# Patient Record
Sex: FEMALE | Race: WHITE | Employment: OTHER | URBAN - METROPOLITAN AREA
[De-identification: names, ages, dates, MRNs, and addresses within clinical notes are randomized per-mention and may not be internally consistent; named-entity substitution may affect disease eponyms.]

---

## 2017-04-12 ENCOUNTER — OFFICE VISIT (OUTPATIENT)
Dept: INTERNAL MEDICINE | Facility: CLINIC | Age: 72
End: 2017-04-12
Payer: COMMERCIAL

## 2017-04-12 ENCOUNTER — TELEPHONE (OUTPATIENT)
Dept: NURSING | Facility: CLINIC | Age: 72
End: 2017-04-12

## 2017-04-12 VITALS
HEIGHT: 64 IN | OXYGEN SATURATION: 98 % | BODY MASS INDEX: 26.77 KG/M2 | HEART RATE: 64 BPM | SYSTOLIC BLOOD PRESSURE: 102 MMHG | WEIGHT: 156.8 LBS | TEMPERATURE: 98 F | DIASTOLIC BLOOD PRESSURE: 60 MMHG

## 2017-04-12 DIAGNOSIS — H61.23 BILATERAL IMPACTED CERUMEN: Primary | ICD-10-CM

## 2017-04-12 PROCEDURE — 99213 OFFICE O/P EST LOW 20 MIN: CPT | Performed by: PHYSICIAN ASSISTANT

## 2017-04-12 NOTE — TELEPHONE ENCOUNTER
"Call Type: Triage Call    Presenting Problem: Ana has  \"right ear that plugged.\"   [facility]  Triage/Ear:  symptoms/disposition is to be seen within 72 hours and  Ana agreed.  Triage Note:  Guideline Title: Ear: Symptoms  Recommended Disposition: Provide Home/Self Care  Original Inclination: Wanted to speak with a nurse  Override Disposition:  Intended Action: Call PCP/HCP  Physician Contacted: No  Plugged or hollow feeling in ear(s) accompanied by a dry non-productive cough OR  recent air travel or underwater diving ?  YES  Laceration/abrasion of ear ? NO  Pain when moving outer ear ? NO  Bloody ear drainage ? NO  Any ear drainage in immunocompromised person ? NO  Severe pain (sharp, stabbing, throbbing or excruciating aching) unresponsive to 24  hours of home care ? NO  Constant or intermittent dull earache, throbbing in ear(s) or feeling of fullness;  may interfere with sleep or ability to carry out normal activities ? NO  Temperature of 101.5 F (38.6 C) or greater that has not responded to 24 hours of  home care measures ? NO  Blunt ear trauma resulting in a small mass/knot (hematoma) of the ear lobe ? NO  Ear drainage that is thick and yellow (looks like pus not earwax) ? NO  Recent trauma AND blood or clear fluid draining from ear(s) OR new deafness or  marked decrease in hearing ? NO  Pain, swelling, tenderness or redness over bone behind ear ? NO  Severe, persistent tinnitus (ringing in ears) lasting more than 7 days AND  depression ? NO  Severe, persistent tinnitus AND not previously evaluated ? NO  Sudden complete or partial hearing loss (three days or less) not associated with  any other signs or symptoms ? NO  Muffled or decreased hearing and suspected ear wax buildup AND unresolved with  home care ? NO  Object present in ear for more than 6 hours ? NO  Swelling, tenderness, OR redness of visible portion of outer ear ? NO  Pain in ear followed by a \"pop\" with new or sudden onset of change in " hearing,  ringing in ear, ear drainage, OR continued pain AND not resolved within 12 hours  ? NO  Known or suspected ear foreign body AND new or worsening hearing loss or other  symptom ? NO  Other head injury is of most concern ? NO  Any temperature elevation in an immunocompromised individual OR frail elderly with  signs of dehydration ? NO  Physician Instructions:  Care Advice: Call provider if symptoms worsen or new symptoms develop.  Do not use eardrops unless directed by a healthcare provider, especially if  having ear drainage.  CAUTIONS  SYMPTOM / CONDITION MANAGEMENT  Consider nonprescription decongestant (Sudafed, Drixoral) for relief of  symptoms after checking with a provider, especially if there is a history  of hypertension, hyperthyroidism, heart disease, diabetes, glaucoma,  urinary retention caused by prostatic hypertrophy.  When Traveling By Air: - During airplane descent, swallow, yawn,  autoinflate by breathing in, or hold nose gently and blow out while keeping  mouth closed. - Chewing gum or sucking on candy will stimulate swallowing.  - Do not sleep during the descent. - People with an upper respiratory  infection may consider taking an nonprescription decongestant nasal spray  or oral decongestant an hour before takeoff and landing, if not  contraindicated by an existing diagnosis. - Consider use of nonprescription  filtered earplugs (e.g. Ear Planes) which slowly equalize ear pressure  during takeoff and landing.

## 2017-04-12 NOTE — MR AVS SNAPSHOT
"              After Visit Summary   4/12/2017    Ana Leyva    MRN: [id number]           Patient Information     Date Of Birth          1945        Visit Information        Provider Department      4/12/2017 3:00 PM Hemalatha Lomas PA-C [facility]        Today's Diagnoses     Bilateral impacted cerumen    -  1       Follow-ups after your visit        Who to contact     If you have questions or need follow up information about today's clinic visit or your schedule please contact [facility] directly at [phone].  Normal or non-critical lab and imaging results will be communicated to you by MyChart, letter or phone within 4 business days after the clinic has received the results. If you do not hear from us within 7 days, please contact the clinic through [employer] or phone. If you have a critical or abnormal lab result, we will notify you by phone as soon as possible.  Submit refill requests through [employer] or call your pharmacy and they will forward the refill request to us. Please allow 3 business days for your refill to be completed.          Additional Information About Your Visit        MyChart Information     [employer] gives you secure access to your electronic health record. If you see a primary care provider, you can also send messages to your care team and make appointments. If you have questions, please call your primary care clinic.  If you do not have a primary care provider, please call [phone] and they will assist you.        Care EveryWhere ID     This is your Care EveryWhere ID. This could be used by other organizations to access your [place] medical records  [id number]        Your Vitals Were     Pulse Temperature Height Pulse Oximetry BMI (Body Mass Index)       64 98  F (36.7  C) 5' 3.5\" (1.613 m) 98% 27.34 kg/m2        Blood Pressure from Last 3 Encounters:   04/12/17 102/60   11/14/16 118/73   10/13/16 118/68    Weight " from Last 3 Encounters:   04/12/17 156 lb 12.8 oz (71.1 kg)   11/14/16 169 lb 6.4 oz (76.8 kg)   10/13/16 172 lb (78 kg)              Today, you had the following     No orders found for display       Primary Care Provider Office Phone # Fax #    Marlen Hua [phone]       [facility] [address] [facility] [address]  [facility] [address]        Thank you!     Thank you for choosing [facility]  for your care. Our goal is always to provide you with excellent care. Hearing back from our patients is one way we can continue to improve our services. Please take a few minutes to complete the written survey that you may receive in the mail after your visit with us. Thank you!             Your Updated Medication List - Protect others around you: Learn how to safely use, store and throw away your medicines at www.disposemymeds.org.          This list is accurate as of: 4/12/17  4:01 PM.  Always use your most recent med list.                   Brand Name Dispense Instructions for use    Biotin 5000 MCG Caps      Take 1 capsule by mouth daily       CALCIUM-MAGNESIUM-ZINC PO      Take 1 tablet by mouth daily       cholecalciferol 1000 UNIT tablet    vitamin D    100 tablet    Take 1 tablet by mouth daily.       DAILY MULTIVITAMIN PO      Take  by mouth. Mature 50+       Flaxseed Oil 1200 MG Caps      Take 1 capsule by mouth daily       GLUCOSAMINE-CHONDROIT-MSM-C-MN PO      1500 - 800 - 750mg       levothyroxine 88 MCG tablet    SYNTHROID/LEVOTHROID    90 tablet    Take 1 tablet (88 mcg) by mouth daily       OMEGA-3 FISH OIL PO      Take 2 g by mouth daily       OMEGA-3-6-9 PO      Take 67 mg by mouth.

## 2017-04-12 NOTE — NURSING NOTE
"Chief Complaint   Patient presents with     Tinnitus     R ear       Initial /60  Pulse 64  Temp 98  F (36.7  C)  Ht 5' 3.5\" (1.613 m)  Wt 156 lb 12.8 oz (71.1 kg)  SpO2 98%  BMI 27.34 kg/m2 Estimated body mass index is 27.34 kg/(m^2) as calculated from the following:    Height as of this encounter: 5' 3.5\" (1.613 m).    Weight as of this encounter: 156 lb 12.8 oz (71.1 kg).  Medication Reconciliation: complete   Jen Singh MA   "

## 2017-04-12 NOTE — PROGRESS NOTES
"  SUBJECTIVE:                                                    Ana Leyva is a 71 year old female who presents to clinic today for the following health issues:      Concern - tinnitus     Onset: 5 days     Description:   Ringing in R ear, feels pressure/plugged in the head     Intensity: mild    Progression of Symptoms:  worsening    Accompanying Signs & Symptoms:  none     Previous history of similar problem:   Yes,  Years ago    Precipitating factors:   Worsened by: none    Alleviating factors:  Improved by: none       Therapies Tried and outcome:warm water, no relief    Hx of allergies, just got back from [place]- drove-     -------------------------------------    Problem list and histories reviewed & adjusted, as indicated.  Additional history: as documented    Labs reviewed in EPIC    Reviewed and updated as needed this visit by clinical staff  Allergies       Reviewed and updated as needed this visit by Provider         ROS:  Constitutional, HEENT, cardiovascular, pulmonary, gi and gu systems are negative, except as otherwise noted.    OBJECTIVE:                                                    /60  Pulse 64  Temp 98  F (36.7  C)  Ht 5' 3.5\" (1.613 m)  Wt 156 lb 12.8 oz (71.1 kg)  SpO2 98%  BMI 27.34 kg/m2  Body mass index is 27.34 kg/(m^2).  GENERAL: healthy, alert and no distress  HENT: normal cephalic/atraumatic, both ears: occluded with wax and oropharynx clear  RESP: lungs clear to auscultation - no rales, rhonchi or wheezes  CV: regular rates and rhythm and normal S1 S2, no S3 or S4    Diagnostic Test Results:  none      ASSESSMENT/PLAN:                                                            1. Bilateral impacted cerumen  Ear lavage with good results         F.u prn with pcp    Hemalatha Lomas PA-C  [facility]  "

## 2017-05-19 ENCOUNTER — OFFICE VISIT (OUTPATIENT)
Dept: FAMILY MEDICINE | Facility: CLINIC | Age: 72
End: 2017-05-19
Payer: COMMERCIAL

## 2017-05-19 VITALS
HEART RATE: 64 BPM | WEIGHT: 156 LBS | DIASTOLIC BLOOD PRESSURE: 63 MMHG | BODY MASS INDEX: 26.63 KG/M2 | SYSTOLIC BLOOD PRESSURE: 108 MMHG | HEIGHT: 64 IN | OXYGEN SATURATION: 98 % | TEMPERATURE: 97.7 F

## 2017-05-19 DIAGNOSIS — M85.80 OSTEOPENIA: Chronic | ICD-10-CM

## 2017-05-19 DIAGNOSIS — R91.8 PULMONARY NODULES: Primary | Chronic | ICD-10-CM

## 2017-05-19 DIAGNOSIS — E03.9 ACQUIRED HYPOTHYROIDISM: Chronic | ICD-10-CM

## 2017-05-19 DIAGNOSIS — G56.01 CARPAL TUNNEL SYNDROME OF RIGHT WRIST: ICD-10-CM

## 2017-05-19 DIAGNOSIS — E61.1 IRON DEFICIENCY: ICD-10-CM

## 2017-05-19 PROCEDURE — 99213 OFFICE O/P EST LOW 20 MIN: CPT | Performed by: INTERNAL MEDICINE

## 2017-05-19 NOTE — PATIENT INSTRUCTIONS
Schedule future lung CT scan - Please call to schedule your radiology study at [facility]: [phone]  Colonoscopy, bone density and mammogram this winter after your physical  See me in November  Keep up the great work!

## 2017-05-19 NOTE — NURSING NOTE
"Chief Complaint   Patient presents with     Follow Up For     PT and recheck Thyroid.       Initial /63 (BP Location: Left arm, Cuff Size: Adult Regular)  Pulse 64  Temp 97.7  F (36.5  C) (Tympanic)  Ht 5' 3.5\" (1.613 m)  Wt 156 lb (70.8 kg)  SpO2 98%  Breastfeeding? No  BMI 27.2 kg/m2 Estimated body mass index is 27.2 kg/(m^2) as calculated from the following:    Height as of this encounter: 5' 3.5\" (1.613 m).    Weight as of this encounter: 156 lb (70.8 kg).  Medication Reconciliation: complete     Hemalatha Manning MA      "

## 2017-05-19 NOTE — PROGRESS NOTES
"  SUBJECTIVE:                                                    Ana Leyva is a 71 year old female who presents to clinic today for the following health issues:    Ana had a nice winter in [place]. Did have to unfortunately make a trek back d/t death of her sister.    Patient reports of back issues associated with long car rides and traveling. Reports alleviation with physical activity. She did the physical therapy I had ordered at her physical last fall and says that helped a lot. She has also been working on slow weight loss with decreased portions and more activity and has been quite successful. Is on Weight Watchers.    S/P rotator cuff surgery of right arm and complains of sharp pain intermittently upon ROM now of left shoulder - she will try to journal patterns. Not anxious for another surgery. Is mostly with reaching forward. Still has good strength.     Aware she is due for colonoscopy and will be sure to complete this [place] and [place]EXA at that time too.    Patient mentions of carpal tunnel and asks about treatment options, but does not prefer surgery. We discussed bracing at [facility] and she liked that option.    Taking Mg+ for leg cramps and asks if there is anything she could do to help this. Currently taking iron sometimes; denies constipation.      ROS:  Detailed as above     This document serves as a record of the services and decisions personally performed and made by Marlen Hua DO. It was created on his/her behalf by Lluvia Amado, a trained medical scribe. The creation of this document is based the provider's statements to the medical scribe.  Scribe Lluvia Amado 4:57 PM, May 19, 2017    OBJECTIVE:                                                    /63 (BP Location: Left arm, Cuff Size: Adult Regular)  Pulse 64  Temp 97.7  F (36.5  C) (Tympanic)  Ht 5' 3.5\" (1.613 m)  Wt 156 lb (70.8 kg)  SpO2 98%  Breastfeeding? No  BMI 27.2 kg/m2  Body mass index " is 27.2 kg/(m^2).  GENERAL: healthy, alert and no distress, robust for age  SKIN: scar tissue of R palmar surface of wrist with some tightness of the soft tissue  MSK: c/o some mild discomfort of left shoulder when reaching forward but is able to do so without difficulty  PSYCH: mentation appears normal, affect normal/bright     ASSESSMENT/PLAN:                                                      1. Pulmonary nodule - incidental finding on right   Due for f/t CT  - CT Chest w Contrast; Future    2. Carpal tunnel syndrome of right wrist  Try bracing at noc    3. Acquired hypothyroidism  On stable dose of levothyroxine for years  Will check lab with physical this fall  TSH   Date Value Ref Range Status   07/12/2016 0.84 0.40 - 4.00 mU/L Final       4. Iron deficiency  Does donate blood; will be regular with iron    5. Osteopenia  Due for DEXA this fall          Patient Instructions   Schedule future lung CT scan - [facility]: [phone]  Colonoscopy, bone density and mammogram this winter after your physical  See me in November  Keep up the great work!        The information in this document, created by the medical scribe for me, accurately reflects the services I personally performed and the decisions made by me. I have reviewed and approved this document for accuracy prior to leaving the patient care area.  Marlen Hua DO  4:57 PM, 05/19/17    Marlen Hua DO  [facility]

## 2017-05-19 NOTE — MR AVS SNAPSHOT
After Visit Summary   5/19/2017    Ana Leyva    MRN: [id number]           Patient Information     Date Of Birth          1945        Visit Information        Provider Department      5/19/2017 4:30 PM Marlen Hua, DO [facility]        Today's Diagnoses     Pulmonary nodule - incidental finding on right     -  1      Care Instructions    Schedule future lung CT scan - Please [facility]: [phone]  Colonoscopy, bone density and mammogram this winter after your physical  See me in November  Keep up the great work!          Follow-ups after your visit        Future tests that were ordered for you today     Open Future Orders        Priority Expected Expires Ordered    CT Chest w Contrast Routine  5/19/2018 5/19/2017            Who to contact     If you have questions or need follow up information about today's clinic visit or your schedule please contact [facility] directly at [phone].  Normal or non-critical lab and imaging results will be communicated to you by [employer], letter or phone within 4 business days after the clinic has received the results. If you do not hear from us within 7 days, please contact the clinic through [employer] or phone. If you have a critical or abnormal lab result, we will notify you by phone as soon as possible.  Submit refill requests through [employer] or call your pharmacy and they will forward the refill request to us. Please allow 3 business days for your refill to be completed.          Additional Information About Your Visit        [employer] Information     [employer] gives you secure access to your electronic health record. If you see a primary care provider, you can also send messages to your care team and make appointments. If you have questions, please call your primary care clinic.  If you do not have a primary care provider, please call [phone] and they will assist  "you.        Care EveryWhere ID     This is your Care EveryWhere ID. This could be used by other organizations to access your [place] medical records  [id number]        Your Vitals Were     Pulse Temperature Height Pulse Oximetry Breastfeeding? BMI (Body Mass Index)    64 97.7  F (36.5  C) (Tympanic) 5' 3.5\" (1.613 m) 98% No 27.2 kg/m2       Blood Pressure from Last 3 Encounters:   05/19/17 108/63   04/12/17 102/60   11/14/16 118/73    Weight from Last 3 Encounters:   05/19/17 156 lb (70.8 kg)   04/12/17 156 lb 12.8 oz (71.1 kg)   11/14/16 169 lb 6.4 oz (76.8 kg)               Primary Care Provider Office Phone # Fax #    Marlen Hua [phone]       [facility] [address]  [facility] [address]        Thank you!     Thank you for choosing [facility]  for your care. Our goal is always to provide you with excellent care. Hearing back from our patients is one way we can continue to improve our services. Please take a few minutes to complete the written survey that you may receive in the mail after your visit with us. Thank you!             Your Updated Medication List - Protect others around you: Learn how to safely use, store and throw away your medicines at www.disposemymeds.org.          This list is accurate as of: 5/19/17  5:08 PM.  Always use your most recent med list.                   Brand Name Dispense Instructions for use    Biotin 5000 MCG Caps      Take 1 capsule by mouth daily       CALCIUM-MAGNESIUM-ZINC PO      Take 1 tablet by mouth daily       cholecalciferol 1000 UNIT tablet    vitamin D    100 tablet    Take 1 tablet by mouth daily.       DAILY MULTIVITAMIN PO      Take  by mouth. Mature 50+       Flaxseed Oil 1200 MG Caps      Take 1 capsule by mouth daily       GLUCOSAMINE-CHONDROIT-MSM-C-MN PO      1500 - 800 - 750mg       levothyroxine 88 MCG tablet    SYNTHROID/LEVOTHROID    90 tablet    Take 1 tablet (88 mcg) by mouth daily       " OMEGA-3 FISH OIL PO      Take 2 g by mouth daily       OMEGA-3-6-9 PO      Take 67 mg by mouth.

## 2017-05-21 PROBLEM — G56.01 CARPAL TUNNEL SYNDROME OF RIGHT WRIST: Status: ACTIVE | Noted: 2017-05-21

## 2017-05-21 PROBLEM — Z52.008 BLOOD DONOR: Status: ACTIVE | Noted: 2017-05-21

## 2017-05-26 ENCOUNTER — HOSPITAL ENCOUNTER (OUTPATIENT)
Dept: CT IMAGING | Facility: CLINIC | Age: 72
Discharge: HOME | End: 2017-05-26
Attending: INTERNAL MEDICINE | Admitting: INTERNAL MEDICINE
Payer: MEDICARE

## 2017-05-26 DIAGNOSIS — R91.8 PULMONARY NODULES: Chronic | ICD-10-CM

## 2017-05-26 PROCEDURE — 71260 CT THORAX DX C+: CPT

## 2017-05-26 PROCEDURE — 25000125 ZZHC RX 250: Performed by: INTERNAL MEDICINE

## 2017-05-26 PROCEDURE — 25000128 H RX IP 250 OP 636: Performed by: INTERNAL MEDICINE

## 2017-05-26 RX ORDER — IOPAMIDOL 755 MG/ML
75 INJECTION, SOLUTION INTRAVASCULAR ONCE
Status: COMPLETED | OUTPATIENT
Start: 2017-05-26 | End: 2017-05-26

## 2017-05-26 RX ADMIN — IOPAMIDOL 75 ML: 755 INJECTION, SOLUTION INTRAVENOUS at 10:36

## 2017-05-26 RX ADMIN — SODIUM CHLORIDE 70 ML: 9 INJECTION, SOLUTION INTRAVENOUS at 10:36

## 2017-08-28 ENCOUNTER — OFFICE VISIT (OUTPATIENT)
Dept: FAMILY MEDICINE | Facility: CLINIC | Age: 72
End: 2017-08-28
Payer: COMMERCIAL

## 2017-08-28 VITALS
OXYGEN SATURATION: 98 % | TEMPERATURE: 97.8 F | SYSTOLIC BLOOD PRESSURE: 115 MMHG | HEART RATE: 69 BPM | DIASTOLIC BLOOD PRESSURE: 68 MMHG | BODY MASS INDEX: 26.91 KG/M2 | WEIGHT: 157.6 LBS | HEIGHT: 64 IN

## 2017-08-28 DIAGNOSIS — R25.2 CRAMP OF LIMB: ICD-10-CM

## 2017-08-28 DIAGNOSIS — R14.1 FLATULENCE, ERUCTATION AND GAS PAIN: ICD-10-CM

## 2017-08-28 DIAGNOSIS — R14.3 FLATULENCE, ERUCTATION AND GAS PAIN: ICD-10-CM

## 2017-08-28 DIAGNOSIS — Z12.11 SPECIAL SCREENING FOR MALIGNANT NEOPLASMS, COLON: ICD-10-CM

## 2017-08-28 DIAGNOSIS — R14.2 FLATULENCE, ERUCTATION AND GAS PAIN: ICD-10-CM
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
## 2017-08-28 PROCEDURE — 99213 OFFICE O/P EST LOW 20 MIN: CPT | Performed by: INTERNAL MEDICINE

## 2017-08-28 RX ORDER — LEVOTHYROXINE SODIUM 88 UG/1
88 TABLET ORAL DAILY
Qty: 90 TABLET | Refills: 3 | Status: CANCELLED | OUTPATIENT
Start: 2017-08-28

## 2017-08-28 NOTE — NURSING NOTE
"Chief Complaint   Patient presents with     Hand Injury     Left little finger       Initial /68 (BP Location: Right arm, Patient Position: Chair, Cuff Size: Adult Regular)  Pulse 69  Temp 97.8  F (36.6  C) (Oral)  Ht 5' 3.5\" (1.613 m)  Wt 157 lb 9.6 oz (71.5 kg)  SpO2 98%  BMI 27.48 kg/m2 Estimated body mass index is 27.48 kg/(m^2) as calculated from the following:    Height as of this encounter: 5' 3.5\" (1.613 m).    Weight as of this encounter: 157 lb 9.6 oz (71.5 kg).  Medication Reconciliation: complete   Jacquie Nguyen MA  "

## 2017-08-28 NOTE — PATIENT INSTRUCTIONS
Augmentin to cover infection in your finger  You should get a call to schedule colonoscopy  Consider adding in Miralax to regulate stools better  Stretches and/or tonic water for leg cramps  See me in November

## 2017-08-28 NOTE — PROGRESS NOTES
"  SUBJECTIVE:   Ana Leyva is a 71 year old female who presents to clinic today for the following health issues:    Kirstin is here for acute concern today  Left little finger injury 8- with nail - accidental scrape at her home. Not thought to be a very dirty nail. Cleaned it well but still really red.   She is concerned b/c is going out of the country soon.  Up to date on tetanus.  No fever or joint pain.    On another note, has c/o leg cramps at night and taking magnesium and potassium; every other night only.    Is constipated too. Due soon for colonoscopy. No warning signs.    Problem list and histories reviewed & adjusted, as indicated.    ROS:  Detailed as above     OBJECTIVE:     /68 (BP Location: Right arm, Patient Position: Chair, Cuff Size: Adult Regular)  Pulse 69  Temp 97.8  F (36.6  C) (Oral)  Ht 5' 3.5\" (1.613 m)  Wt 157 lb 9.6 oz (71.5 kg)  SpO2 98%  BMI 27.48 kg/m2  Body mass index is 27.48 kg/(m^2).  Alert, pleasant, healthy appearing, NAD  Left 5th finger medial aspect intermediate phalynx with cut and surrounding erythema and swelling    ASSESSMENT/PLAN:       1. Injury by nail, initial encounter  Cover with augmentin as is red and swollen and is going to be [place] the country soon without access to care  Up to date on tetanus from 11 months ago  - amoxicillin-clavulanate (AUGMENTIN) 875-125 MG per tablet; Take 1 tablet by mouth 2 times daily  Dispense: 20 tablet; Refill: 0    2. Flatulence, eructation and gas pain  Trial of Miralax to help with her constipation  Due soon for TSH but has been on stable dose of Levothyroxine for quite awhile now    3. Special screening for malignant neoplasms, colon  Last colonoscopy was Dec 2007  - GASTROENTEROLOGY ADULT REF PROCEDURE ONLY    4. Cramp of limb  Trial of stretching and tonic water      Patient Instructions   Augmentin to cover infection in your finger  You should get a call to schedule colonoscopy  Consider adding in " Miralax to regulate stools better  Stretches and/or tonic water for leg cramps  See me in November      Marlen Hua,   [facility]

## 2017-08-28 NOTE — MR AVS SNAPSHOT
After Visit Summary   8/28/2017    Ana Leyva    MRN: [id number]           Patient Information     Date Of Birth          1945        Visit Information        Provider Department      8/28/2017 12:00 PM Marlen Hua,  [facility]        Today's Diagnoses     Injury by nail, initial encounter    -  1    Flatulence, eructation and gas pain        Special screening for malignant neoplasms, colon        Cramp of limb          Care Instructions    Augmentin to cover infection in your finger  You should get a call to schedule colonoscopy  Consider adding in Miralax to regulate stools better  Stretches and/or tonic water for leg cramps  See me in November          Follow-ups after your visit        Additional Services     GASTROENTEROLOGY ADULT REF PROCEDURE ONLY       Last Lab Result: Creatinine (mg/dL)       Date                     Value                 07/12/2016               0.82             ----------  Body mass index is 27.48 kg/(m^2).     Needed:  No  Language:  English    Patient will be contacted to schedule procedure.     Please be aware that coverage of these services is subject to the terms and limitations of your health insurance plan.  Call member services at your health plan with any benefit or coverage questions.  Any procedures must be performed at a [place] facility OR coordinated by your clinic's referral office.    Please bring the following with you to your appointment:    (1) Any X-Rays, CTs or MRIs which have been performed.  Contact the facility where they were done to arrange for  prior to your scheduled appointment.    (2) List of current medications   (3) This referral request   (4) Any documents/labs given to you for this referral                  Your next 10 appointments already scheduled     Nov 17, 2017  9:30 AM CST   PHYSICAL with Marlen Hua DO   [facility] ([facility])    [address]  "[facility] [id number] [phone]              Who to contact     If you have questions or need follow up information about today's clinic visit or your schedule please contact [facility] directly at [phone].  Normal or non-critical lab and imaging results will be communicated to you by MyChart, letter or phone within 4 business days after the clinic has received the results. If you do not hear from us within 7 days, please contact the clinic through MyChart or phone. If you have a critical or abnormal lab result, we will notify you by phone as soon as possible.  Submit refill requests through [employer] or call your pharmacy and they will forward the refill request to us. Please allow 3 business days for your refill to be completed.          Additional Information About Your Visit        [employer] Information     [employer] lets you send messages to your doctor, view your test results, renew your prescriptions, schedule appointments and more. To sign up, go to [email or web address] . Click on \"Log in\" on the left side of the screen, which will take you to the Welcome page. Then click on \"Sign up Now\" on the right side of the page.     You will be asked to enter the access code listed below, as well as some personal information. Please follow the directions to create your username and password.     Your access code is: [email or web address]  Expires: 2017 12:08 PM     Your access code will  in 90 days. If you need help or a new code, please call your [facility] or [phone].        Care EveryWhere ID     This is your Care EveryWhere ID. This could be used by other organizations to access your [place] medical records  [id number]        Your Vitals Were     Pulse Temperature Height Pulse Oximetry BMI (Body Mass Index)       69 97.8  F (36.6  C) (Oral) 5' 3.5\" (1.613 m) 98% 27.48 kg/m2        Blood Pressure from Last 3 Encounters:   17 115/68   17 108/63   17 102/60    " Weight from Last 3 Encounters:   08/28/17 157 lb 9.6 oz (71.5 kg)   05/19/17 156 lb (70.8 kg)   04/12/17 156 lb 12.8 oz (71.1 kg)              We Performed the Following     GASTROENTEROLOGY ADULT REF PROCEDURE ONLY     MYCHART ACCESS CODE - Add PCP and Click on cosign on right          Today's Medication Changes          These changes are accurate as of: 8/28/17 12:47 PM.  If you have any questions, ask your nurse or doctor.               Start taking these medicines.        Dose/Directions    amoxicillin-clavulanate 875-125 MG per tablet   Commonly known as:  AUGMENTIN   Used for:  Injury by nail, initial encounter   Started by:  Marlen Hua DO        Dose:  1 tablet   Take 1 tablet by mouth 2 times daily   Quantity:  20 tablet   Refills:  0         Stop taking these medicines if you haven't already. Please contact your care team if you have questions.     OMEGA-3-6-9 PO   Stopped by:  Marlen Hua DO                Where to get your medicines      These medications were sent to [facility], [place] - [address], [address]  [address], [facility] [address]     Phone:  [phone]     amoxicillin-clavulanate 875-125 MG per tablet                Primary Care Provider Office Phone # Fax #    Marlen Hua [phone] [address]  [facility] [address]        Equal Access to Services     [facility] AH: Hadii aad ku hadasho Soomaali, waaxda luqadaha, qaybta kaalmada adeegyada, juan manuel ospina . So [facility] [phone].    ATENCIÓN: Si habla español, tiene a garsia disposición servicios gratuitos de asistencia lingüística. Víctor al [phone].    We comply with applicable federal civil rights laws and [place] laws. We do not discriminate on the basis of race, color, national origin, age, disability sex, sexual orientation or gender identity.            Thank you!     Thank you for choosing [facility]  [place]  for your care. Our goal is always to provide you with excellent care. Hearing back from our patients is one way we can continue to improve our services. Please take a few minutes to complete the written survey that you may receive in the mail after your visit with us. Thank you!             Your Updated Medication List - Protect others around you: Learn how to safely use, store and throw away your medicines at www.disposemymeds.org.          This list is accurate as of: 8/28/17 12:47 PM.  Always use your most recent med list.                   Brand Name Dispense Instructions for use Diagnosis    amoxicillin-clavulanate 875-125 MG per tablet    AUGMENTIN    20 tablet    Take 1 tablet by mouth 2 times daily    Injury by nail, initial encounter       Biotin 5000 MCG Caps      Take 1 capsule by mouth daily        CALCIUM-MAGNESIUM-ZINC PO      Take 1 tablet by mouth daily        cholecalciferol 1000 UNIT tablet    vitamin D    100 tablet    Take 1 tablet by mouth daily.    Routine general medical examination at a health care facility       DAILY MULTIVITAMIN PO      Take  by mouth. Mature 50+        Flaxseed Oil 1200 MG Caps      Take 1 capsule by mouth daily        GLUCOSAMINE-CHONDROIT-MSM-C-MN PO      1500 - 800 - 750mg        levothyroxine 88 MCG tablet    SYNTHROID/LEVOTHROID    90 tablet    Take 1 tablet (88 mcg) by mouth daily    Acquired hypothyroidism       OMEGA-3 FISH OIL PO      Take 2 g by mouth daily

## 2017-09-06 ENCOUNTER — TELEPHONE (OUTPATIENT)
Dept: FAMILY MEDICINE | Facility: CLINIC | Age: 72
End: 2017-09-06

## 2017-09-06 NOTE — TELEPHONE ENCOUNTER
Reason for Call:   prescription    Detailed comments: Pt is calling regarding amoxicillin-clavulanate (AUGMENTIN) 875-125 MG per tablet  States that she has broken out in a rash after starting to take Rx   States it is all over her body     Phone Number Patient can be reached at: Home number on file [phone] (home)    Best Time: anytime    Can we leave a detailed message on this number? YES    Call taken on 9/6/2017 at 11:44 AM by Susana Gottlieb

## 2017-09-06 NOTE — TELEPHONE ENCOUNTER
Pt has been taking amoxicillin since OV 8/28/17.  Has 3 tabs left but d/c yesterday due to possible allergic reaction    Pt reports starting yesterday, 8 days into RX: little red dots like measles all over body, Torso, legs, arms and rash more concentrated on back.  Rash is warm to touch, and itchy but not painful. (4 inch x4 inch concentrated area)  Yesterday felt slightly short of breath, and headache and d/c medication.  Has been using benadryl and Cortizone since for sx.  Today: Denies sob and headache, however rash and itching still present at same level as yesterday.     Triage advised:   Continue benadryl and cortizone cream for sx.  If any SOB or worsening rash from this point, or if symptoms not improving by tomorrow, needs to seek medical care in [place].  Pt agreed with this plan.  Pt will not be back until 9/19/17.    Added Amox to allergy list.    If any further advice ok for triage to leave detailed message .    [phone]

## 2017-09-07 NOTE — TELEPHONE ENCOUNTER
PT called back - rash did worsen, still no sob, did see provider in [place] and following provider recs.  Advised pt to call to clinic for OV with sajan (amria g) if still having sx near end of trip) and to follow recommendations to be seen in [place] if sx worsening sooner.  Layla Johnson RN

## 2017-09-22 ENCOUNTER — OFFICE VISIT (OUTPATIENT)
Dept: FAMILY MEDICINE | Facility: CLINIC | Age: 72
End: 2017-09-22
Payer: COMMERCIAL

## 2017-09-22 VITALS
HEART RATE: 71 BPM | TEMPERATURE: 97.7 F | OXYGEN SATURATION: 96 % | WEIGHT: 163 LBS | DIASTOLIC BLOOD PRESSURE: 73 MMHG | SYSTOLIC BLOOD PRESSURE: 127 MMHG | BODY MASS INDEX: 32 KG/M2 | HEIGHT: 60 IN

## 2017-09-22 DIAGNOSIS — T50.905A DRUG REACTION, INITIAL ENCOUNTER: Primary | ICD-10-CM

## 2017-09-22 PROCEDURE — 99213 OFFICE O/P EST LOW 20 MIN: CPT | Performed by: INTERNAL MEDICINE

## 2017-09-22 RX ORDER — BETAMETHASONE VALERATE 1.2 MG/G
CREAM TOPICAL 2 TIMES DAILY
COMMUNITY
End: 2017-11-17

## 2017-09-22 RX ORDER — FEXOFENADINE HCL 180 MG/1
180 TABLET ORAL DAILY
COMMUNITY
End: 2018-09-04

## 2017-09-22 RX ORDER — FEXOFENADINE HCL AND PSEUDOEPHEDRINE HCI 60; 120 MG/1; MG/1
1 TABLET, EXTENDED RELEASE ORAL
COMMUNITY
End: 2017-09-22

## 2017-09-22 RX ORDER — FEXOFENADINE HCL AND PSEUDOEPHEDRINE HCL 180; 240 MG/1; MG/1
1 TABLET, EXTENDED RELEASE ORAL DAILY
COMMUNITY
End: 2017-09-22

## 2017-09-22 NOTE — PROGRESS NOTES
"[facility]  CLINIC PROGRESS NOTE    Subjective:  Rash   Ana Leyva was started on amoxicillin in August 28 and stopped this antibiotic on Sept 5 after developing diffuse rash all over her body.   She went to a hospital and treated with fexofenadine and betamethasone cream.  She took prescription for 5 tablets and noticed that the rash was clearing and so she stopped all medications.   She returned to [place] from [facility] 5 days ago.  She noticed that a new rash occurred two days ago on her chest.     Past medical history, medications, allergies, social history, family history reviewed and updated in [facility] as of 9/22/2017 .    ROS  CONSTITUTIONAL: no fatigue, no unexpected change in weight  SKIN: rash as above  EYES: no acute vision problems or changes  ENT: no ear problems, no mouth problems, no throat problems  RESP: no significant cough, no shortness of breath  CV: no chest pain, no palpitations, no new or worsening peripheral edema  GI: no nausea, no vomiting, no constipation, no diarrhea  : no frequency, no dysuria, no hematuria  MS: no claudication, no myalgias, no joint aches  PSYCHIATRIC: no changes in mood or affect      Objective:  Vitals  /73  Pulse 71  Temp 97.7  F (36.5  C) (Oral)  Ht 5' 0.35\" (1.533 m)  Wt 163 lb (73.9 kg)  SpO2 96%  Breastfeeding? No  BMI 31.47 kg/m2  GEN: Alert Oriented x3 NAD  HEENT: Atraumatic, normocephalic, neck supple  CV: RRR no murmurs or rubs  PULM: CTA no wheezes or crackles  ABD: Soft, nontender nondistended, no hepatosplenomegally  SKIN: Diffuse erythematous macular rash on chest  EXT: 2+ dorsal pedis pulses, no edema bilateral lower extremities  NEURO: Gait and station deferred, No focal neurologic deficits  PSYCH: Mood good, affect mood congruent    No results found for this or any previous visit (from the past 24 hour(s)).    Assessment/Plan:  Patient Instructions   (T88.7XXA) Drug reaction, initial encounter  (primary encounter " diagnosis)  Comment: Rash could be lingering from previous Augmentin, also consider monitoring any fragrances that you are using and we will continue with antihistamine (allegra) and topical betamethasone cream twice per day.  If over the weekend you are experiencing shortness of breath or wheezing go directly to the emergency department.   Plan:        Follow up in emergency department if not improving    Disclaimer: This note consists of symbols derived from keyboarding, dictation and/or voice recognition software. As a result, there may be errors in the script that have gone undetected. Please consider this when interpreting information found in this chart.    Jay Benavides MD  [phone]

## 2017-09-22 NOTE — NURSING NOTE
"Chief Complaint   Patient presents with     Rash     possible allergic reaction to antibiotic       Initial /73  Pulse 71  Temp 97.7  F (36.5  C) (Oral)  Ht 5' 0.35\" (1.533 m)  Wt 163 lb (73.9 kg)  SpO2 96%  Breastfeeding? No  BMI 31.47 kg/m2 Estimated body mass index is 31.47 kg/(m^2) as calculated from the following:    Height as of this encounter: 5' 0.35\" (1.533 m).    Weight as of this encounter: 163 lb (73.9 kg).  Medication Reconciliation: complete   [place]- CMA      "

## 2017-09-22 NOTE — PROGRESS NOTES
"HPI    SUBJECTIVE:   Ana Leyva is a 72 year old female who presents to clinic today for the following health issues:    ***      Duration: ***    Description (location/character/radiation): ***    Intensity:  {mild,moderate,severe:354600}    Accompanying signs and symptoms: ***    History (similar episodes/previous evaluation): {.:729159::\"None\"}    Precipitating or alleviating factors: {.:779159::\"None\"}    Therapies tried and outcome: {.:240240::\"None\"}         {additional problems for provider to add:888621}    Problem list and histories reviewed & adjusted, as indicated.  Additional history: {NONE - AS DOCUMENTED:570367::\"as documented\"}    {HIST REVIEW/ LINKS 2:017784}    Reviewed and updated as needed this visit by clinical staff     Reviewed and updated as needed this visit by Provider         {PROVIDER CHARTING PREFERENCE:923297}      ROS      Physical Exam      "

## 2017-09-22 NOTE — MR AVS SNAPSHOT
After Visit Summary   9/22/2017    Ana Leyva    MRN: [id number]           Patient Information     Date Of Birth          1945        Visit Information        Provider Department      9/22/2017 3:00 PM Jay Benavides MD [facility]        Today's Diagnoses     Drug reaction, initial encounter    -  1      Care Instructions    (T88.7XXA) Drug reaction, initial encounter  (primary encounter diagnosis)  Comment: Rash could be lingering from previous Augmentin, also consider monitoring any fragrances that you are using and we will continue with antihistamine (allegra) and topical betamethasone cream twice per day.  If over the weekend you are experiencing shortness of breath or wheezing go directly to the emergency department.   Plan:              Follow-ups after your visit        Your next 10 appointments already scheduled     Nov 17, 2017  9:30 AM CST   PHYSICAL with Marlen Hua DO   [facility]    [address] [facility] [address] [phone]              Who to contact     If you have questions or need follow up information about today's clinic visit or your schedule please contact [facility] directly at [phone].  Normal or non-critical lab and imaging results will be communicated to you by MyChart, letter or phone within 4 business days after the clinic has received the results. If you do not hear from us within 7 days, please contact the clinic through MyChart or phone. If you have a critical or abnormal lab result, we will notify you by phone as soon as possible.  Submit refill requests through [employer] or call your pharmacy and they will forward the refill request to us. Please allow 3 business days for your refill to be completed.          Additional Information About Your Visit        [employer] Information     [employer] lets you send messages to your doctor, view your test results, renew your  "prescriptions, schedule appointments and more. To sign up, go to [email or web address] . Click on \"Log in\" on the left side of the screen, which will take you to the Welcome page. Then click on \"Sign up Now\" on the right side of the page.     You will be asked to enter the access code listed below, as well as some personal information. Please follow the directions to create your username and password.     Your access code is: [email or web address]  Expires: 2017 12:08 PM     Your access code will  in 90 days. If you need help or a new code, please call your [place] clinic or [phone].        Care EveryWhere ID     This is your Care EveryWhere ID. This could be used by other organizations to access your [place] medical records  [id number]        Your Vitals Were     Pulse Temperature Height Pulse Oximetry Breastfeeding? BMI (Body Mass Index)    71 97.7  F (36.5  C) (Oral) 5' 0.35\" (1.533 m) 96% No 31.47 kg/m2       Blood Pressure from Last 3 Encounters:   17 127/73   17 115/68   17 108/63    Weight from Last 3 Encounters:   17 163 lb (73.9 kg)   17 157 lb 9.6 oz (71.5 kg)   17 156 lb (70.8 kg)              Today, you had the following     No orders found for display       Primary Care Provider Office Phone # Fax #    Marlen Karley Hua [phone] [address] [facility] [address]  [place] [address]        Equal Access to Services     LARA MEYER : Hadii matt Weir, stephen hampton, juan manuel moreland. So [facility] [phone].    ATENCIÓN: Si habla español, tiene a garsia disposición servicios gratuitos de asistencia lingüística. Llame al [phone].    We comply with applicable federal civil rights laws and [place] laws. We do not discriminate on the basis of race, color, national origin, age, disability sex, sexual orientation or gender identity.            Thank you!     Thank you for choosing " [facility]  for your care. Our goal is always to provide you with excellent care. Hearing back from our patients is one way we can continue to improve our services. Please take a few minutes to complete the written survey that you may receive in the mail after your visit with us. Thank you!             Your Updated Medication List - Protect others around you: Learn how to safely use, store and throw away your medicines at www.disposemymeds.org.          This list is accurate as of: 9/22/17  3:32 PM.  Always use your most recent med list.                   Brand Name Dispense Instructions for use Diagnosis    ALLEGRA ALLERGY 180 MG tablet   Generic drug:  fexofenadine      Take 180 mg by mouth daily Taking 120 mg AM and 180 mg PM        betamethasone valerate 0.1 % cream    VALISONE     Apply topically 2 times daily        Biotin 5000 MCG Caps      Take 1 capsule by mouth daily        CALCIUM-MAGNESIUM-ZINC PO      Take 1 tablet by mouth daily        cholecalciferol 1000 UNIT tablet    vitamin D    100 tablet    Take 1 tablet by mouth daily.    Routine general medical examination at a health care facility       DAILY MULTIVITAMIN PO      Take  by mouth. Mature 50+        Flaxseed Oil 1200 MG Caps      Take 1 capsule by mouth daily        GLUCOSAMINE-CHONDROIT-MSM-C-MN PO      1500 - 800 - 750mg        levothyroxine 88 MCG tablet    SYNTHROID/LEVOTHROID    90 tablet    Take 1 tablet (88 mcg) by mouth daily    Acquired hypothyroidism       OMEGA-3 FISH OIL PO      Take 2 g by mouth daily

## 2017-09-22 NOTE — PATIENT INSTRUCTIONS
(T88.7XXA) Drug reaction, initial encounter  (primary encounter diagnosis)  Comment: Rash could be lingering from previous Augmentin, also consider monitoring any fragrances that you are using and we will continue with antihistamine (allegra) and topical betamethasone cream twice per day.  If over the weekend you are experiencing shortness of breath or wheezing go directly to the emergency department.   Plan:

## 2017-11-16 NOTE — PATIENT INSTRUCTIONS
Lab and flu shot today  Future bone density with mammogram and then colonoscopy as well as dermatology before [place]  If your back pain gets worse, may need some physical therapy or medical care in [place]  Otherwise, follow up annually or as needed    Preventive Health Recommendations  Female Ages 65 +    Yearly exam:     See your health care provider every year in order to  o Review health changes.   o Discuss preventive care.    o Review your medicines if your doctor has prescribed any.      You no longer need a yearly Pap test unless you've had an abnormal Pap test in the past 10 years. If you have vaginal symptoms, such as bleeding or discharge, be sure to talk with your provider about a Pap test.      Every 1 to 2 years, have a mammogram.  If you are over 69, talk with your health care provider about whether or not you want to continue having screening mammograms.      Every 10 years, have a colonoscopy. Or, have a yearly FIT test (stool test). These exams will check for colon cancer.       Have a cholesterol test every 5 years, or more often if your doctor advises it.       Have a diabetes test (fasting glucose) every three years. If you are at risk for diabetes, you should have this test more often.       At age 65, have a bone density scan (DEXA) to check for osteoporosis (brittle bone disease).    Shots:    Get a flu shot each year.    Get a tetanus shot every 10 years.    Talk to your doctor about your pneumonia vaccines. There are now two you should receive - Pneumovax (PPSV 23) and Prevnar (PCV 13).    Talk to your doctor about the shingles vaccine.    Talk to your doctor about the hepatitis B vaccine.    Nutrition:     Eat at least 5 servings of fruits and vegetables each day.      Eat whole-grain bread, whole-wheat pasta and brown rice instead of white grains and rice.      Talk to your provider about Calcium and Vitamin D.     Lifestyle    Exercise at least 150 minutes a week (30 minutes a day, 5 days a  week). This will help you control your weight and prevent disease.      Limit alcohol to one drink per day.      No smoking.       Wear sunscreen to prevent skin cancer.       See your dentist twice a year for an exam and cleaning.      See your eye doctor every 1 to 2 years to screen for conditions such as glaucoma, macular degeneration and cataracts.

## 2017-11-16 NOTE — PROGRESS NOTES
SUBJECTIVE:   Ana Leyva is a 72 year old female who presents for Preventive Visit.  Are you in the first 12 months of your Medicare Part B coverage?  No    Healthy Habits:    Do you get at least three servings of calcium containing foods daily (dairy, green leafy vegetables, etc.)? yes    Amount of exercise or daily activities, outside of work: 5 day(s) per week    Problems taking medications regularly No    Medication side effects: No    Have you had an eye exam in the past two years? Yes     Do you see a dentist twice per year? Yes     Do you have sleep apnea, excessive snoring or daytime drowsiness?no    COGNITIVE SCREEN  1) Repeat 3 items (Banana, Sunrise, Chair)    2) Clock draw: NORMAL  3) 3 item recall: Recalls 3 objects  Results: 3 items recalled: COGNITIVE IMPAIRMENT LESS LIKELY    Mini-CogTM Copyright S Virgil. Licensed by the author for use in [place] bitmovin; reprinted with permission (camacho@.[facility]). All rights reserved.        Since I last saw her she had an allergic reaction to Augmentin  Went to hospital in [place] - given a salve and anti-histamine - she has pictures with classic appearing drug rash across body and it eventually resolved  Nail puncture wound was ok (reason Augmentin was Rx)  Back flaring up some but she isn't that bothered by it - going to [place] Dec 12th for the winter  Once only had a mild radiculopathy and then stopped the specific exercises that seemed to cause the radiculopathy  Movement in AM helps and is active during the day; if she sits still too long it worsens the back pain  No weakness, numbness or b/b changes - we reviewed signs to watch for  Weight Watchers has been very successful for her  Very healthy weight now and maintaining nearly a year  Mammo and colonoscopy scheduled and willing to schedule DEXA [place]  Mild fullness in throat recently for a few days which resolved spontaneously - she wonders if was a mild  "virus      Allergies        Social History   Substance Use Topics     Smoking status: Never Smoker     Smokeless tobacco: Never Used      Comment: tried in college     Alcohol use 0.0 oz/week     0 Standard drinks or equivalent per week      Comment: occas wine - very rare       The patient does not drink >3 drinks per day nor >7 drinks per week.     Today's PHQ-2 Score:   PHQ-2 ( 1999 Pfizer) 11/17/2017 8/28/2017   Q1: Little interest or pleasure in doing things 0 0   Q2: Feeling down, depressed or hopeless 0 0   PHQ-2 Score 0 0         Do you feel safe in your environment - Yes    Do you have a Health Care Directive?: Yes: Advance Directive has been received and scanned.    Current providers sharing in care for this patient include: Patient Care Team:  Marlen Hua DO as PCP - General (Internal Medicine)      Hearing impairment: No    Ability to successfully perform activities of daily living: Yes, no assistance needed     Fall risk:  Fallen 2 or more times in the past year?: No  Any fall with injury in the past year?: No      Home safety:  none identified      The following health maintenance items are reviewed in Epic and correct as of today:  Health Maintenance   Topic Date Due     TSH W/ FREE T4 REFLEX Q1 YEAR  07/12/2017     INFLUENZA VACCINE (SYSTEM ASSIGNED)  09/01/2017     FALL RISK ASSESSMENT  11/14/2017     DEXA Q3 YR  11/26/2017     COLONOSCOPY Q10 YR  12/06/2017     MAMMO SCREEN Q2 YR (SYSTEM ASSIGNED)  11/30/2018     ADVANCE DIRECTIVE PLANNING Q5 YRS  11/12/2021     LIPID MONITORING Q5 YEARS  11/30/2021     TETANUS Q10 YR  09/07/2026     PNEUMOCOCCAL  Completed     HEPATITIS C SCREENING  Completed       ROS:  Comprehensive ROS negative unless as stated above in HPI.      OBJECTIVE:   /71 (BP Location: Right arm, Patient Position: Chair, Cuff Size: Adult Regular)  Pulse 65  Temp 97.5  F (36.4  C) (Tympanic)  Ht 5' 3.5\" (1.613 m)  Wt 158 lb (71.7 kg)  SpO2 100%  BMI 27.55 kg/m2 " "Estimated body mass index is 27.55 kg/(m^2) as calculated from the following:    Height as of this encounter: 5' 3.5\" (1.613 m).    Weight as of this encounter: 158 lb (71.7 kg).  EXAM:   GENERAL APPEARANCE: healthy, alert and no distress, robust lady  EYES: Eyes grossly normal to inspection, PERRL and conjunctivae and sclerae normal  HENT: ear canals and TM's normal, nose and mouth without ulcers or lesions, oropharynx clear and oral mucous membranes moist  NECK: no adenopathy, no asymmetry, masses, or scars and thyroid normal to palpation  RESP: lungs clear to auscultation - no rales, rhonchi or wheezes  BREAST: normal without masses, tenderness or nipple discharge and no palpable axillary masses or adenopathy  CV: regular rate and rhythm, normal S1 S2, no S3 or S4, no murmur, click or rub, no peripheral edema   ABDOMEN: soft, nontender, no hepatosplenomegaly, no masses and bowel sounds normal  MS: no musculoskeletal defects are noted and gait is age appropriate without ataxia  NEURO: Normal strength and tone, mentation intact and speech normal  PSYCH: mentation appears normal and affect normal/bright    ASSESSMENT / PLAN:   1. Encounter for preventative adult health care exam with abnormal findings  Doing excellently  Finished the f/u CT on incidental pulmonary nodule  Will see her dermatologist before heading [place] as well as finish up preventative health care  - CBC with platelets  - Comprehensive metabolic panel    2. Acquired hypothyroidism  S/p remote EPPS for goiter  - TSH WITH FREE T4 REFLEX  - levothyroxine (SYNTHROID/LEVOTHROID) 88 MCG tablet; Take 1 tablet (88 mcg) by mouth daily  Dispense: 90 tablet; Refill: 3  - Lipid panel reflex to direct LDL Fasting    3. Osteopenia, unspecified location  Due for DEXA    4. Asymptomatic postmenopausal status  - DEXA HIP/PELVIS/SPINE - Future; Future    5. Need for prophylactic vaccination and inoculation against influenza  Administered today      End of Life " "Planning:  Patient currently has an advanced directive: Yes.  Practitioner is supportive of decision.    COUNSELING:  Reviewed preventive health counseling, as reflected in patient instructions       Regular exercise       Healthy diet/nutrition  Estimated body mass index is 27.55 kg/(m^2) as calculated from the following:    Height as of this encounter: 5' 3.5\" (1.613 m).    Weight as of this encounter: 158 lb (71.7 kg).   reports that she has never smoked. She has never used smokeless tobacco.        Appropriate preventive services were discussed with this patient, including applicable screening as appropriate for cardiovascular disease, diabetes, osteopenia/osteoporosis, and glaucoma.  As appropriate for age/gender, discussed screening for colorectal cancer, prostate cancer, breast cancer, and cervical cancer. Checklist reviewing preventive services available has been given to the patient.    Reviewed patients plan of care and provided an AVS. The Intermediate Care Plan ( asthma action plan, low back pain action plan, and migraine action plan) for Ana meets the Care Plan requirement. This Care Plan has been established and reviewed with the Patient.    Patient Instructions   Lab and flu shot today  Future bone density with mammogram and then colonoscopy as well as dermatology before [place]  If your back pain gets worse, may need some physical therapy or medical care in [place]  Otherwise, follow up annually or as needed    Marlen Hua, DO  [facility]  "

## 2017-11-17 ENCOUNTER — OFFICE VISIT (OUTPATIENT)
Dept: FAMILY MEDICINE | Facility: CLINIC | Age: 72
End: 2017-11-17
Payer: COMMERCIAL

## 2017-11-17 VITALS
HEART RATE: 65 BPM | WEIGHT: 158 LBS | OXYGEN SATURATION: 100 % | DIASTOLIC BLOOD PRESSURE: 71 MMHG | BODY MASS INDEX: 26.98 KG/M2 | HEIGHT: 64 IN | SYSTOLIC BLOOD PRESSURE: 130 MMHG | TEMPERATURE: 97.5 F

## 2017-11-17 DIAGNOSIS — Z00.01 ENCOUNTER FOR PREVENTATIVE ADULT HEALTH CARE EXAM WITH ABNORMAL FINDINGS: Primary | ICD-10-CM

## 2017-11-17 DIAGNOSIS — E03.9 ACQUIRED HYPOTHYROIDISM: Chronic | ICD-10-CM

## 2017-11-17 DIAGNOSIS — Z78.0 ASYMPTOMATIC POSTMENOPAUSAL STATUS: ICD-10-CM

## 2017-11-17 DIAGNOSIS — M85.80 OSTEOPENIA, UNSPECIFIED LOCATION: Chronic | ICD-10-CM

## 2017-11-17 DIAGNOSIS — Z23 NEED FOR PROPHYLACTIC VACCINATION AND INOCULATION AGAINST INFLUENZA: ICD-10-CM

## 2017-11-17 LAB
ERYTHROCYTE [DISTWIDTH] IN BLOOD BY AUTOMATED COUNT: 14.9 % (ref 10–15)
HCT VFR BLD AUTO: 36.2 % (ref 35–47)
HGB BLD-MCNC: 11.5 G/DL (ref 11.7–15.7)
MCH RBC QN AUTO: 26.3 PG (ref 26.5–33)
MCHC RBC AUTO-ENTMCNC: 31.8 G/DL (ref 31.5–36.5)
MCV RBC AUTO: 83 FL (ref 78–100)
PLATELET # BLD AUTO: 291 10E9/L (ref 150–450)
RBC # BLD AUTO: 4.37 10E12/L (ref 3.8–5.2)
WBC # BLD AUTO: 5.3 10E9/L (ref 4–11)

## 2017-11-17 PROCEDURE — G0008 ADMIN INFLUENZA VIRUS VAC: HCPCS | Performed by: INTERNAL MEDICINE

## 2017-11-17 PROCEDURE — 82306 VITAMIN D 25 HYDROXY: CPT | Performed by: INTERNAL MEDICINE

## 2017-11-17 PROCEDURE — 99397 PER PM REEVAL EST PAT 65+ YR: CPT | Mod: 25 | Performed by: INTERNAL MEDICINE

## 2017-11-17 PROCEDURE — 84443 ASSAY THYROID STIM HORMONE: CPT | Performed by: INTERNAL MEDICINE

## 2017-11-17 PROCEDURE — 36415 COLL VENOUS BLD VENIPUNCTURE: CPT | Performed by: INTERNAL MEDICINE

## 2017-11-17 PROCEDURE — 85027 COMPLETE CBC AUTOMATED: CPT | Performed by: INTERNAL MEDICINE

## 2017-11-17 PROCEDURE — 80061 LIPID PANEL: CPT | Performed by: INTERNAL MEDICINE

## 2017-11-17 PROCEDURE — 80053 COMPREHEN METABOLIC PANEL: CPT | Performed by: INTERNAL MEDICINE

## 2017-11-17 PROCEDURE — 90662 IIV NO PRSV INCREASED AG IM: CPT | Performed by: INTERNAL MEDICINE

## 2017-11-17 RX ORDER — FOLIC ACID 0.8 MG
1 TABLET ORAL DAILY
Qty: 30 CAPSULE | COMMUNITY
Start: 2017-11-17

## 2017-11-17 RX ORDER — LEVOTHYROXINE SODIUM 88 UG/1
88 TABLET ORAL DAILY
Qty: 90 TABLET | Refills: 3 | Status: SHIPPED | OUTPATIENT
Start: 2017-11-17 | End: 2018-11-19

## 2017-11-17 NOTE — MR AVS SNAPSHOT
After Visit Summary   11/17/2017    Ana Leyva    MRN: [id number]           Patient Information     Date Of Birth          1945        Visit Information        Provider Department      11/17/2017 9:30 AM Marlen Hua,  [facility]        Today's Diagnoses     Encounter for preventative adult health care exam with abnormal findings    -  1    Acquired hypothyroidism        Osteopenia, unspecified location        Asymptomatic postmenopausal status        Need for prophylactic vaccination and inoculation against influenza          Care Instructions    Lab and flu shot today  Future bone density with mammogram and then colonoscopy as well as dermatology before [place]  If your back pain gets worse, may need some physical therapy or medical care in [place]  Otherwise, follow up annually or as needed    Preventive Health Recommendations  Female Ages 65 +    Yearly exam:     See your health care provider every year in order to  o Review health changes.   o Discuss preventive care.    o Review your medicines if your doctor has prescribed any.      You no longer need a yearly Pap test unless you've had an abnormal Pap test in the past 10 years. If you have vaginal symptoms, such as bleeding or discharge, be sure to talk with your provider about a Pap test.      Every 1 to 2 years, have a mammogram.  If you are over 69, talk with your health care provider about whether or not you want to continue having screening mammograms.      Every 10 years, have a colonoscopy. Or, have a yearly FIT test (stool test). These exams will check for colon cancer.       Have a cholesterol test every 5 years, or more often if your doctor advises it.       Have a diabetes test (fasting glucose) every three years. If you are at risk for diabetes, you should have this test more often.       At age 65, have a bone density scan (DEXA) to check for osteoporosis (brittle bone disease).    Shots:    Get a flu shot each  year.    Get a tetanus shot every 10 years.    Talk to your doctor about your pneumonia vaccines. There are now two you should receive - Pneumovax (PPSV 23) and Prevnar (PCV 13).    Talk to your doctor about the shingles vaccine.    Talk to your doctor about the hepatitis B vaccine.    Nutrition:     Eat at least 5 servings of fruits and vegetables each day.      Eat whole-grain bread, whole-wheat pasta and brown rice instead of white grains and rice.      Talk to your provider about Calcium and Vitamin D.     Lifestyle    Exercise at least 150 minutes a week (30 minutes a day, 5 days a week). This will help you control your weight and prevent disease.      Limit alcohol to one drink per day.      No smoking.       Wear sunscreen to prevent skin cancer.       See your dentist twice a year for an exam and cleaning.      See your eye doctor every 1 to 2 years to screen for conditions such as glaucoma, macular degeneration and cataracts.          Follow-ups after your visit        Your next 10 appointments already scheduled     Nov 27, 2017   Procedure with Donell Nelson MD   [facility] ([facility])    [facility] [address]   [phone]           [facility] is located at [address] [place] [address]            Dec 04, 2017  1:00 PM CST   MA SCREENING DIGITAL BILATERAL with [id number]   [facility] ([facility])    [address] [place] [address], [address]  [facility] [id number] [phone]           Do not use any powder, lotion or deodorant under your arms or on your breast. If you do, we will ask you to remove it before your exam.  Wear comfortable, two-piece clothing.  If you have any allergies, tell your care team.  Bring any previous mammograms from other facilities or have them mailed to the breast center. Three-dimensional (3D) mammograms are available at [facility] locations in [facility],  "[place], [facility], [place], [place], and [place]. [facility] locations include [facility] and [facility] in [place]. Benefits of 3D mammograms include: - Improved rate of cancer detection - Decreases your chance of having to go back for more tests, which means fewer: - \"False-positive\" results (This means that there is an abnormal area but it isn't cancer.) - Invasive testing procedures, such as a biopsy or surgery - Can provide clearer images of the breast if you have dense breast tissue. 3D mammography is an optional exam that anyone can have with a 2D mammogram. It doesn't replace or take the place of a 2D mammogram. 2D mammograms remain an effective screening test for all women.  Not all insurance companies cover the cost of a 3D mammogram. Check with your insurance.              Future tests that were ordered for you today     Open Future Orders        Priority Expected Expires Ordered    DEXA HIP/PELVIS/SPINE - Future Routine  11/16/2018 11/17/2017    MA Screening Digital Bilateral Routine  11/16/2018 11/16/2017            Who to contact     If you have questions or need follow up information about today's clinic visit or your schedule please contact [facility] directly at [phone].  Normal or non-critical lab and imaging results will be communicated to you by SnowGatehart, letter or phone within 4 business days after the clinic has received the results. If you do not hear from us within 7 days, please contact the clinic through Eleven Jamest or phone. If you have a critical or abnormal lab result, we will notify you by phone as soon as possible.  Submit refill requests through Wesabe or call your pharmacy and they will forward the refill request to us. Please allow 3 business days for your refill to be completed.          Additional Information About Your Visit        Wesabe Information     Wesabe lets you send messages to your doctor, view your test results, renew your " "prescriptions, schedule appointments and more. To sign up, go to [email or web address] . Click on \"Log in\" on the left side of the screen, which will take you to the Welcome page. Then click on \"Sign up Now\" on the right side of the page.     You will be asked to enter the access code listed below, as well as some personal information. Please follow the directions to create your username and password.     Your access code is: [email or web address]  Expires: 2017 11:08 AM     Your access code will  in 90 days. If you need help or a new code, please call your [facility] clinic or [phone].        Care EveryWhere ID     This is your Care EveryWhere ID. This could be used by other organizations to access your [facility] medical records  [id number]        Your Vitals Were     Pulse Temperature Height Pulse Oximetry BMI (Body Mass Index)       65 97.5  F (36.4  C) (Tympanic) 5' 3.5\" (1.613 m) 100% 27.55 kg/m2        Blood Pressure from Last 3 Encounters:   17 130/71   17 127/73   17 115/68    Weight from Last 3 Encounters:   17 158 lb (71.7 kg)   17 163 lb (73.9 kg)   17 157 lb 9.6 oz (71.5 kg)              We Performed the Following     CBC with platelets     Comprehensive metabolic panel     Lipid panel reflex to direct LDL Fasting     TSH WITH FREE T4 REFLEX          Where to get your medicines      Some of these will need a paper prescription and others can be bought over the counter.  Ask your nurse if you have questions.     Bring a paper prescription for each of these medications     levothyroxine 88 MCG tablet          Primary Care Provider Office Phone # Fax #    Marlen Hua [phone] [address]  [facility] [address]        Equal Access to Services     LARA MEYER : Carolyn Weir, waaxda luqadaha, qaybta kaaljuwan combs, juan manuel kimble. [facility] [phone].    ATENCIÓN: Si nevaeh " español, tiene a garsia disposición servicios gratuitos de asistencia lingüística. Víctor blake [phone].    We comply with applicable federal civil rights laws and [place] laws. We do not discriminate on the basis of race, color, national origin, age, disability, sex, sexual orientation, or gender identity.            Thank you!     Thank you for choosing [facility]  for your care. Our goal is always to provide you with excellent care. Hearing back from our patients is one way we can continue to improve our services. Please take a few minutes to complete the written survey that you may receive in the mail after your visit with us. Thank you!             Your Updated Medication List - Protect others around you: Learn how to safely use, store and throw away your medicines at www.disposemymeds.org.          This list is accurate as of: 11/17/17 10:23 AM.  Always use your most recent med list.                   Brand Name Dispense Instructions for use Diagnosis    ALLEGRA ALLERGY 180 MG tablet   Generic drug:  fexofenadine      Take 180 mg by mouth daily Taking 120 mg AM and 180 mg PM        Biotin 5000 MCG Caps      Take 1 capsule by mouth daily        Calcium 600-400 MG-UNIT Chew      Take 1 chew tab by mouth daily        cholecalciferol 1000 UNIT tablet    vitamin D3    100 tablet    Take 1 tablet by mouth daily.    Routine general medical examination at a health care facility       DAILY MULTIVITAMIN PO      Take  by mouth. Mature 50+        Flaxseed Oil 1200 MG Caps      Take 1 capsule by mouth daily        GLUCOSAMINE-CHONDROIT-MSM-C-MN PO      1500 - 800 - 750mg        levothyroxine 88 MCG tablet    SYNTHROID/LEVOTHROID    90 tablet    Take 1 tablet (88 mcg) by mouth daily    Acquired hypothyroidism       Magnesium 500 MG Caps     30 capsule    Take 1 capsule by mouth daily        OMEGA-3 FISH OIL PO      Take 2 g by mouth daily

## 2017-11-17 NOTE — LETTER
[facility]  [address] [facility]  [address]  [place]  [address]  Tel: [phone]    November 27, 2017    Ana R Autumn  [address] [facility] [facility] [address]        Dear MsJose Valentemohamud,    It was nice to see you in clinic recently!   You have a very mild anemia (low hemoglobin) so it will be good that you are having a colonoscopy to screen for any microscopic blood loss.   Are you donating any blood again?   Your kidney function (GFR) is normal. Liver testing (AST, ALT, alkaline phosphatase and bilirubin) is normal.   Your fasting glucose is minimally elevated but not to a concerning degree. You are doing such a good job with healthy lifestyle changes.   Your cholesterol is excellent.   Your thyroid and vitamin D levels are stable.   Please let me know if you have any questions or concerns and about the mildly low hemoglobin. Thanks!     If you have any further questions or problems, please contact our office.      Sincerely,    Marlen Hua DO/AR[facility]          Enclosure: Lab Results  Results for orders placed or performed in visit on 11/17/17   TSH WITH FREE T4 REFLEX   Result Value Ref Range    TSH 1.09 0.40 - 4.00 mU/L   CBC with platelets   Result Value Ref Range    WBC 5.3 4.0 - 11.0 10e9/L    RBC Count 4.37 3.8 - 5.2 10e12/L    Hemoglobin 11.5 (L) 11.7 - 15.7 g/dL    Hematocrit 36.2 35.0 - 47.0 %    MCV 83 78 - 100 fl    MCH 26.3 (L) 26.5 - 33.0 pg    MCHC 31.8 31.5 - 36.5 g/dL    RDW 14.9 10.0 - 15.0 %    Platelet Count 291 150 - 450 10e9/L   Comprehensive metabolic panel   Result Value Ref Range    Sodium 140 133 - 144 mmol/L    Potassium 5.1 3.4 - 5.3 mmol/L    Chloride 107 94 - 109 mmol/L    Carbon Dioxide 25 20 - 32 mmol/L    Anion Gap 8 3 - 14 mmol/L    Glucose 102 (H) 70 - 99 mg/dL    Urea Nitrogen 13 7 - 30 mg/dL    Creatinine 0.87 0.52 - 1.04 mg/dL    GFR Estimate 65 >60 mL/min/1.7m2    GFR Estimate If Black 78 >60 mL/min/1.7m2    Calcium 9.6 8.5 - 10.1 mg/dL    Bilirubin Total 0.5  0.2 - 1.3 mg/dL    Albumin 3.9 3.4 - 5.0 g/dL    Protein Total 7.4 6.8 - 8.8 g/dL    Alkaline Phosphatase 65 40 - 150 U/L    ALT 21 0 - 50 U/L    AST 27 0 - 45 U/L   Lipid panel reflex to direct LDL Fasting   Result Value Ref Range    Cholesterol 158 <200 mg/dL    Triglycerides 71 <150 mg/dL    HDL Cholesterol 100 >49 mg/dL    LDL Cholesterol Calculated 44 <100 mg/dL    Non HDL Cholesterol 58 <130 mg/dL   Vitamin D Deficiency   Result Value Ref Range    Vitamin D Deficiency screening 57 20 - 75 ug/L

## 2017-11-17 NOTE — NURSING NOTE
"Chief Complaint   Patient presents with     Wellness Visit     fasting        Initial /71 (BP Location: Right arm, Patient Position: Chair, Cuff Size: Adult Regular)  Pulse 65  Temp 97.5  F (36.4  C) (Tympanic)  Ht 5' 3.5\" (1.613 m)  Wt 158 lb (71.7 kg)  SpO2 100%  BMI 27.55 kg/m2 Estimated body mass index is 27.55 kg/(m^2) as calculated from the following:    Height as of this encounter: 5' 3.5\" (1.613 m).    Weight as of this encounter: 158 lb (71.7 kg)..    BP completed using cuff size: regular  MEDICATIONS REVIEWED  SOCIAL AND FAMILY HX REVIEWED  Marci Qureshi CMA  "

## 2017-11-17 NOTE — PROGRESS NOTES

## 2017-11-17 NOTE — NURSING NOTE
Prior to injection verified patient identity using patient's name and date of birth.  Screening Questionnaire for Adult Immunization    Are you sick today?   No   Do you have allergies to medications, food, a vaccine component or latex?   No   Have you ever had a serious reaction after receiving a vaccination?   No   Do you have a long-term health problem with heart disease, lung disease, asthma, kidney disease, metabolic disease (e.g. diabetes), anemia, or other blood disorder?   No   Do you have cancer, leukemia, HIV/AIDS, or any other immune system problem?   No   In the past 3 months, have you taken medications that affect  your immune system, such as prednisone, other steroids, or anticancer drugs; drugs for the treatment of rheumatoid arthritis, Crohn s disease, or psoriasis; or have you had radiation treatments?   No   Have you had a seizure, or a brain or other nervous system problem?   No   During the past year, have you received a transfusion of blood or blood     products, or been given immune (gamma) globulin or antiviral drug?   No   For women: Are you pregnant or is there a chance you could become        pregnant during the next month?   No   Have you received any vaccinations in the past 4 weeks?   No     Immunization questionnaire answers were all negative.        Per orders of Dr. bacon, injection of flu shot given by Marci Qureshi. Patient instructed to remain in clinic for 15 minutes afterwards, and to report any adverse reaction to me immediately.       Screening performed by Marci Qureshi on 11/17/2017 at 10:34 AM.

## 2017-11-18 LAB
ALBUMIN SERPL-MCNC: 3.9 G/DL (ref 3.4–5)
ALP SERPL-CCNC: 65 U/L (ref 40–150)
ALT SERPL W P-5'-P-CCNC: 21 U/L (ref 0–50)
ANION GAP SERPL CALCULATED.3IONS-SCNC: 8 MMOL/L (ref 3–14)
AST SERPL W P-5'-P-CCNC: 27 U/L (ref 0–45)
BILIRUB SERPL-MCNC: 0.5 MG/DL (ref 0.2–1.3)
BUN SERPL-MCNC: 13 MG/DL (ref 7–30)
CALCIUM SERPL-MCNC: 9.6 MG/DL (ref 8.5–10.1)
CHLORIDE SERPL-SCNC: 107 MMOL/L (ref 94–109)
CHOLEST SERPL-MCNC: 158 MG/DL
CO2 SERPL-SCNC: 25 MMOL/L (ref 20–32)
CREAT SERPL-MCNC: 0.87 MG/DL (ref 0.52–1.04)
GFR SERPL CREATININE-BSD FRML MDRD: 65 ML/MIN/1.7M2
GLUCOSE SERPL-MCNC: 102 MG/DL (ref 70–99)
HDLC SERPL-MCNC: 100 MG/DL
LDLC SERPL CALC-MCNC: 44 MG/DL
NONHDLC SERPL-MCNC: 58 MG/DL
POTASSIUM SERPL-SCNC: 5.1 MMOL/L (ref 3.4–5.3)
PROT SERPL-MCNC: 7.4 G/DL (ref 6.8–8.8)
SODIUM SERPL-SCNC: 140 MMOL/L (ref 133–144)
TRIGL SERPL-MCNC: 71 MG/DL
TSH SERPL DL<=0.005 MIU/L-ACNC: 1.09 MU/L (ref 0.4–4)

## 2017-11-20 LAB — DEPRECATED CALCIDIOL+CALCIFEROL SERPL-MC: 57 UG/L (ref 20–75)

## 2017-11-27 ENCOUNTER — SURGERY (OUTPATIENT)
Age: 72
End: 2017-11-27

## 2017-11-27 ENCOUNTER — HOSPITAL ENCOUNTER (OUTPATIENT)
Facility: CLINIC | Age: 72
Discharge: HOME | End: 2017-11-27
Attending: COLON & RECTAL SURGERY | Admitting: COLON & RECTAL SURGERY
Payer: MEDICARE

## 2017-11-27 VITALS
RESPIRATION RATE: 9 BRPM | DIASTOLIC BLOOD PRESSURE: 60 MMHG | OXYGEN SATURATION: 97 % | SYSTOLIC BLOOD PRESSURE: 102 MMHG

## 2017-11-27 LAB — COLONOSCOPY: NORMAL

## 2017-11-27 PROCEDURE — G0500 MOD SEDAT ENDO SERVICE >5YRS: HCPCS | Performed by: INTERNAL MEDICINE

## 2017-11-27 PROCEDURE — 25000128 H RX IP 250 OP 636: Performed by: INTERNAL MEDICINE

## 2017-11-27 PROCEDURE — 45378 DIAGNOSTIC COLONOSCOPY: CPT | Performed by: INTERNAL MEDICINE

## 2017-11-27 PROCEDURE — G0121 COLON CA SCRN NOT HI RSK IND: HCPCS | Performed by: INTERNAL MEDICINE

## 2017-11-27 RX ORDER — LIDOCAINE 40 MG/G
CREAM TOPICAL
Status: DISCONTINUED | OUTPATIENT
Start: 2017-11-27 | End: 2017-11-27 | Stop reason: HOSPADM

## 2017-11-27 RX ORDER — FENTANYL CITRATE 50 UG/ML
INJECTION, SOLUTION INTRAMUSCULAR; INTRAVENOUS PRN
Status: DISCONTINUED | OUTPATIENT
Start: 2017-11-27 | End: 2017-11-27 | Stop reason: HOSPADM

## 2017-11-27 RX ORDER — ONDANSETRON 2 MG/ML
4 INJECTION INTRAMUSCULAR; INTRAVENOUS
Status: DISCONTINUED | OUTPATIENT
Start: 2017-11-27 | End: 2017-11-27 | Stop reason: HOSPADM

## 2017-11-27 RX ADMIN — FENTANYL CITRATE 100 MCG: 50 INJECTION, SOLUTION INTRAMUSCULAR; INTRAVENOUS at 13:18

## 2017-11-27 RX ADMIN — MIDAZOLAM HYDROCHLORIDE 2 MG: 1 INJECTION, SOLUTION INTRAMUSCULAR; INTRAVENOUS at 13:18

## 2017-11-27 RX ADMIN — MIDAZOLAM HYDROCHLORIDE 2 MG: 1 INJECTION, SOLUTION INTRAMUSCULAR; INTRAVENOUS at 13:25

## 2017-12-04 ENCOUNTER — HOSPITAL ENCOUNTER (OUTPATIENT)
Dept: BONE DENSITY | Facility: CLINIC | Age: 72
End: 2017-12-04
Attending: INTERNAL MEDICINE
Payer: MEDICARE

## 2017-12-04 ENCOUNTER — HOSPITAL ENCOUNTER (OUTPATIENT)
Dept: MAMMOGRAPHY | Facility: CLINIC | Age: 72
Discharge: HOME | End: 2017-12-04
Attending: INTERNAL MEDICINE | Admitting: INTERNAL MEDICINE
Payer: MEDICARE

## 2017-12-04 DIAGNOSIS — Z78.0 ASYMPTOMATIC POSTMENOPAUSAL STATUS: ICD-10-CM

## 2017-12-04 DIAGNOSIS — Z12.31 VISIT FOR SCREENING MAMMOGRAM: ICD-10-CM

## 2017-12-04 PROCEDURE — 77080 DXA BONE DENSITY AXIAL: CPT

## 2017-12-04 PROCEDURE — 77063 BREAST TOMOSYNTHESIS BI: CPT

## 2018-02-07 ENCOUNTER — TELEPHONE (OUTPATIENT)
Dept: FAMILY MEDICINE | Facility: CLINIC | Age: 73
End: 2018-02-07

## 2018-02-07 NOTE — TELEPHONE ENCOUNTER
"Returned patient's call:     Symptoms for 3-4 days    Cough: Dry, nonproductive   Runny nose: Nose dripping yesterday, clear. Improved some today  Sore throat: \"just a little bit\" improved, but again improved today  Fatigue: A little more tired than usual, \"I took a nap yesterday, which is unusual\" I am remaining active  Body aches: Denies   Fevers/chills: Denies, took temp      SOB: Denies   Wheezing: Denies   Dizziness/lightheadedness: A little once in a while. More nasal. Denies feeling       Possible Exposure to bronchitis: Advised to continue home care measures, including fluids, mucinex, rest, humidifier/nasal spray. Call clinic if develops worsening symptoms, fever/sob/wheezing/cough.     Pt agrees to plan.           "

## 2018-02-07 NOTE — TELEPHONE ENCOUNTER
"Reason for call:  Patient reporting a symptom    Symptom or request: Cough, runny nose, slight sore throat, \"feeling awful\"    Duration (how long have symptoms been present): 3 or 4 days    Have you been treated for this before? No    Additional comments: PT would like go over what the flu symptoms are with a nurse    Phone Number patient can be reached at:  Cell number on file:    Telephone Information:   Mobile [phone]       Best Time:  any    Can we leave a detailed message on this number:  YES    Call taken on 2/7/2018 at 2:26 PM by Flaca Cyr  "

## 2018-09-04 ENCOUNTER — OFFICE VISIT (OUTPATIENT)
Dept: FAMILY MEDICINE | Facility: CLINIC | Age: 73
End: 2018-09-04
Payer: COMMERCIAL

## 2018-09-04 VITALS
HEART RATE: 52 BPM | OXYGEN SATURATION: 98 % | WEIGHT: 162.6 LBS | BODY MASS INDEX: 27.76 KG/M2 | SYSTOLIC BLOOD PRESSURE: 128 MMHG | DIASTOLIC BLOOD PRESSURE: 68 MMHG | HEIGHT: 64 IN | TEMPERATURE: 98 F

## 2018-09-04 DIAGNOSIS — D64.9 ANEMIA, UNSPECIFIED TYPE: ICD-10-CM

## 2018-09-04 DIAGNOSIS — Z52.008 BLOOD DONOR: ICD-10-CM

## 2018-09-04 DIAGNOSIS — M54.31 SCIATICA OF RIGHT SIDE: Primary | ICD-10-CM

## 2018-09-04 DIAGNOSIS — Z23 NEED FOR PROPHYLACTIC VACCINATION AND INOCULATION AGAINST INFLUENZA: ICD-10-CM

## 2018-09-04 LAB — HGB BLD-MCNC: 12.6 G/DL (ref 11.7–15.7)

## 2018-09-04 PROCEDURE — G0008 ADMIN INFLUENZA VIRUS VAC: HCPCS | Performed by: NURSE PRACTITIONER

## 2018-09-04 PROCEDURE — 36415 COLL VENOUS BLD VENIPUNCTURE: CPT | Performed by: NURSE PRACTITIONER

## 2018-09-04 PROCEDURE — 85018 HEMOGLOBIN: CPT | Performed by: NURSE PRACTITIONER

## 2018-09-04 PROCEDURE — 90662 IIV NO PRSV INCREASED AG IM: CPT | Performed by: NURSE PRACTITIONER

## 2018-09-04 PROCEDURE — 99214 OFFICE O/P EST MOD 30 MIN: CPT | Mod: 25 | Performed by: NURSE PRACTITIONER

## 2018-09-04 PROCEDURE — 82728 ASSAY OF FERRITIN: CPT | Performed by: NURSE PRACTITIONER

## 2018-09-04 RX ORDER — NAPROXEN 500 MG/1
500 TABLET ORAL AT BEDTIME
Qty: 30 TABLET | Refills: 1 | Status: SHIPPED | OUTPATIENT
Start: 2018-09-04 | End: 2018-11-09

## 2018-09-04 NOTE — MR AVS SNAPSHOT
After Visit Summary   9/4/2018    Ana Leyva    MRN: [id number]           Patient Information     Date Of Birth          1945        Visit Information        Provider Department      9/4/2018 3:00 PM Aleksandra Weems APRN CNP [facility]        Today's Diagnoses     Sciatica of right side    -  1    Blood donor        Anemia, unspecified type           Follow-ups after your visit        Additional Services     [facility] PT, HAND, AND CHIROPRACTIC REFERRAL       **This order will print in the [facility] Scheduling Office**    Physical Therapy, Hand Therapy and Chiropractic Care are available through:    *[facility]  *[facility]  *[facility]    Call one number to schedule at any of the above locations: [phone].    Your provider has referred you to: Physical Therapy at [facility] or [facility]    Indication/Reason for Referral: right sciatic neuralgia  Onset of Illness:back pain since 2016  Therapy Orders: Evaluate and Treat  Special Programs: None  Special Request: None    Shiv Chaney      Additional Comments for the Therapist or Chiropractor: does have some back exercises but got off her therapy originally at [facility]     Please be aware that coverage of these services is subject to the terms and limitations of your health insurance plan.  Call member services at your health plan with any benefit or coverage questions.      Please bring the following to your appointment:    *Your personal calendar for scheduling future appointments  *Comfortable clothing                  Your next 10 appointments already scheduled     Nov 19, 2018 10:30 AM CST   PHYS[facility] with Marlen Hua,    [facility]    [address] [facility] [address] [phone]              Who to contact     If you have questions or need follow up information about today's clinic visit or your schedule please contact [place]  [facility] directly at [phone].  Normal or non-critical lab and imaging results will be communicated to you by MyChart, letter or phone within 4 business days after the clinic has received the results. If you do not hear from us within 7 days, please contact the clinic through [employer] americ[employer]art or phone. If you have a critical or abnormal lab result, we will notify you by phone as soon as possible.  Submit refill requests through [employer] or call your pharmacy and they will forward the refill request to us. Please allow 3 business days for your refill to be completed.          Additional Information About Your Visit        aXess americaharappweevr Information     [employer] lets you send messages to your doctor, view your test results, renew your prescriptions, schedule appointments and more. To sign up, go to [email or web address] . Click on \"Log in\" on the left side of the screen, which will take you to the Welcome page. Then click on \"Sign up Now\" on the right side of the page.     You will be asked to enter the access code listed below, as well as some personal information. Please follow the directions to create your username and password.     Your access code is: [email or web address]  Expires: 12/3/2018  3:01 PM     Your access code will  in 90 days. If you need help or a new code, please call your [facility] clinic or [phone].        Care EveryWhere ID     This is your Care EveryWhere ID. This could be used by other organizations to access your [facility] medical records  [id number]        Your Vitals Were     Pulse Temperature Height Pulse Oximetry Breastfeeding? BMI (Body Mass Index)    52 98  F (36.7  C) (Oral) 5' 3.5\" (1.613 m) 98% No 28.35 kg/m2       Blood Pressure from Last 3 Encounters:   18 128/68   17 102/60   17 130/71    Weight from Last 3 Encounters:   18 162 lb 9.6 oz (73.8 kg)   17 158 lb (71.7 kg)   17 163 lb (73.9 kg)              We Performed the Following     Ferritin     " Hemoglobin     [facility] PT, HAND, AND CHIROPRACTIC REFERRAL          Today's Medication Changes          These changes are accurate as of 9/4/18  3:49 PM.  If you have any questions, ask your nurse or doctor.               Start taking these medicines.        Dose/Directions    naproxen 500 MG tablet   Commonly known as:  NAPROSYN   Used for:  Sciatica of right side   Started by:  Aleksandra Weems APRN CNP        Dose:  500 mg   Take 1 tablet (500 mg) by mouth At Bedtime   Quantity:  30 tablet   Refills:  1            Where to get your medicines      These medications were sent to [facility]/pharmacy #[id number] - [place], [place] - [address]  [address] [address]     Phone:  [phone]     naproxen 500 MG tablet                Primary Care Provider Office Phone # Fax #    Marlen Crandall HuaDO [phone] [address] [facility] [address]  [facility] [address]        Equal Access to Services     [facility]: Hadii aad ku hadasho Soomaali, waaxda luqadaha, qaybta kaalmada adeegyada, waxay idiin hayaan wen ospina . So [facility] [phone].    ATENCIÓN: Si yayala espmukund, tiene a garsia disposición servicios gratuitos de asistencia lingüística. Llame al [phone].    We comply with applicable federal civil rights laws and [place] laws. We do not discriminate on the basis of race, color, national origin, age, disability, sex, sexual orientation, or gender identity.            Thank you!     Thank you for choosing [facility]  for your care. Our goal is always to provide you with excellent care. Hearing back from our patients is one way we can continue to improve our services. Please take a few minutes to complete the written survey that you may receive in the mail after your visit with us. Thank you!             Your Updated Medication List - Protect others around you: Learn how to safely use, store and throw away your medicines at www.disposemymeds.org.          This  list is accurate as of 9/4/18  3:49 PM.  Always use your most recent med list.                   Brand Name Dispense Instructions for use Diagnosis    Biotin 5000 MCG Caps      Take 1 capsule by mouth daily        Calcium 600-400 MG-UNIT Chew      Take 1 chew tab by mouth daily        cholecalciferol 1000 UNIT tablet    vitamin D3    100 tablet    Take 1 tablet by mouth daily.    Routine general medical examination at a health care facility       DAILY MULTIVITAMIN PO      Take  by mouth. Mature 50+        Flaxseed Oil 1200 MG Caps      Take 1 capsule by mouth daily        GLUCOSAMINE-CHONDROIT-MSM-C-MN PO      1500 - 800 - 750mg        levothyroxine 88 MCG tablet    SYNTHROID/LEVOTHROID    90 tablet    Take 1 tablet (88 mcg) by mouth daily    Acquired hypothyroidism       Magnesium 500 MG Caps     30 capsule    Take 1 capsule by mouth daily        naproxen 500 MG tablet    NAPROSYN    30 tablet    Take 1 tablet (500 mg) by mouth At Bedtime    Sciatica of right side       OMEGA-3 FISH OIL PO      Take 2 g by mouth daily

## 2018-09-04 NOTE — PROGRESS NOTES
Injectable Influenza Immunization Documentation    1.  Is the person to be vaccinated sick today?   No    2. Does the person to be vaccinated have an allergy to a component   of the vaccine?   No  Egg Allergy Algorithm Link    3. Has the person to be vaccinated ever had a serious reaction   to influenza vaccine in the past?   No    4. Has the person to be vaccinated ever had Guillain-Barré syndrome?   No    Form completed by patient.    Prior to injection verified patient identity using patient's name and date of birth.  Due to injection administration, patient instructed to remain in clinic for 15 minutes  afterwards, and to report any adverse reaction to me immediately.    Sebastián Avelar, CMA

## 2018-09-04 NOTE — LETTER
[facility]  [address] [facility]  [address]  [place]  [address]  Tel: [phone]    September 7, 2018    Ana R Robinjennifer  [address] [address]        Dear Ms. Robinjennifer,    You don't appear to have anemia now Ana but your ferritin is low meaning you have low stored elements to make the heme molecules.  Please continue to take the iron supplement     If you have any further questions or problems, please contact our office.      Sincerely,    Aleksandra Weems NP/ Trinidad Chandra, CHANDANA  Results for orders placed or performed in visit on 09/04/18   Hemoglobin   Result Value Ref Range    Hemoglobin 12.6 11.7 - 15.7 g/dL   Ferritin   Result Value Ref Range    Ferritin 28 8 - 252 ng/mL               Enclosure: Lab Results

## 2018-09-04 NOTE — PROGRESS NOTES
SUBJECTIVE:   Ana Leyva is a 72 year old female who presents to clinic today for the following health issues:      R leg aches      Duration: 1 week    Description (location/character/radiation): R leg aching, more so at night but also during day with activity    Intensity:  Moderate, 8/10 at worst    Accompanying signs and symptoms: low back pain for 3-4 years    History (similar episodes/previous evaluation): Sept 2016 had low back pain so bad she passed out, subsequently had exercise therapy with improvement, now pain intermittent and mild but just started having right sciatic pain to knee    Precipitating or alleviating factors:sleeeping on her right side    Therapies tried and outcome: walking her 13,000 steps make her back feel better,    Also concerned today about anemia.  Had been dx with anemia previously but negative work up .  So took iron to manage.  Stopped iron supplement due to constipation and when she went to donate blood was told she was anemic again with hgb around 11.    Problem list and histories reviewed & adjusted, as indicated.  Additional history: as documented    Patient Active Problem List   Diagnosis     Acquired hypothyroidism     Advance Care Planning     Osteopenia     Pulmonary nodule - incidental finding on right      Blood donor     Carpal tunnel syndrome of right wrist     Past Surgical History:   Procedure Laterality Date     C DEXA, BONE DENSITY, AXIAL SKEL  11-28-02    normal     C VAGINAL HYSTERECTOMY  1980    ovaries intact, fibroids     COLONOSCOPY N/A 11/27/2017    Procedure: COLONOSCOPY;  colonoscopy;  Surgeon: Donell Nelson MD;  Location:  GI     HYSTERECTOMY, PAP NO LONGER INDICATED  1980    see above     LIGATN/STRIP LONG OR SHORT SAPHEN  2002     ROTATOR CUFF REPAIR RT/LT  4-13    RT     SURGICAL HISTORY OF - 7-31-08    Quadriceps tendon repair R       Social History   Substance Use Topics     Smoking status: Never Smoker     Smokeless tobacco:  Never Used      Comment: tried in college     Alcohol use 0.0 oz/week     0 Standard drinks or equivalent per week      Comment: occas wine - very rare     Family History   Problem Relation Age of Onset     Hypertension Father      C.A.D. Mother      MI early 60's     Diabetes Mother      Obesity Mother      Pancreatic Cancer Sister      HEART DISEASE Sister      MI @ 56; another sister with MI @ 62     Circulatory Brother      blood clots     HEART DISEASE Paternal Grandmother      Diabetes Maternal Grandfather      Alzheimer Disease Paternal Aunt      Alzheimer Disease Paternal Uncle      Anesthesia Reaction No family hx of          Current Outpatient Prescriptions   Medication Sig Dispense Refill     Biotin 5000 MCG CAPS Take 1 capsule by mouth daily       Calcium 600-400 MG-UNIT CHEW Take 1 chew tab by mouth daily       cholecalciferol (VITAMIN D) 1000 UNIT tablet Take 1 tablet by mouth daily. 100 tablet 3     Flaxseed, Linseed, (FLAXSEED OIL) 1200 MG CAPS Take 1 capsule by mouth daily       GLUCOSAMINE-CHONDROIT-MSM-C-MN PO 1500 - 800 - 750mg       levothyroxine (SYNTHROID/LEVOTHROID) 88 MCG tablet Take 1 tablet (88 mcg) by mouth daily 90 tablet 3     Magnesium 500 MG CAPS Take 1 capsule by mouth daily 30 capsule      Multiple Vitamin (DAILY MULTIVITAMIN PO) Take  by mouth. Mature 50+       naproxen (NAPROSYN) 500 MG tablet Take 1 tablet (500 mg) by mouth At Bedtime 30 tablet 1     Omega-3 Fatty Acids (OMEGA-3 FISH OIL PO) Take 2 g by mouth daily       Allergies   Allergen Reactions     Amoxicillin Shortness Of Breath and Rash     Pt reported sx on phone from Amoxicillin RX given - sx showed up on day 8       Reviewed and updated as needed this visit by clinical staff       Reviewed and updated as needed this visit by Provider         ROS:  Constitutional, HEENT, cardiovascular, pulmonary, gi and gu systems are negative, except as otherwise noted.    OBJECTIVE:     /68  Pulse 52  Temp 98  F (36.7  C)  "(Oral)  Ht 5' 3.5\" (1.613 m)  Wt 162 lb 9.6 oz (73.8 kg)  SpO2 98%  Breastfeeding? No  BMI 28.35 kg/m2  Body mass index is 28.35 kg/(m^2).  GENERAL: healthy, alert and no distress  NECK: no adenopathy, no asymmetry, masses, or scars and thyroid normal to palpation  MS: no gross musculoskeletal defects noted, no edema  SKIN: no suspicious lesions or rashes  NEURO: Normal strength and tone, mentation intact and speech normal  BACK: no CVA tenderness, no paralumbar tenderness, mild right SI tenderness, radiating into buttock and right anterolateral thigh to knee, pain is not exacerbated by flexion or lateral flexion or rotation, positive right SLR    PSYCH: mentation appears normal, affect normal/bright    Diagnostic Test Results:  none     ASSESSMENT/PLAN:       ICD-10-CM    1. Sciatica of right side M54.31 naproxen (NAPROSYN) 500 MG tablet     [facility] PT, HAND, AND CHIROPRACTIC REFERRAL   2. Blood donor Z52.008    3. Anemia, unspecified type D64.9 Hemoglobin     Ferritin   take naprosyn 500mg 1/2 to 1 nightly to help with nighttime pain  Try sleeping on your back   Continue iron supplement       VICENTE Branch [facility]    "

## 2018-09-05 ENCOUNTER — THERAPY VISIT (OUTPATIENT)
Dept: PHYSICAL THERAPY | Facility: CLINIC | Age: 73
End: 2018-09-05
Payer: COMMERCIAL

## 2018-09-05 DIAGNOSIS — M99.05 SEGMENTAL AND SOMATIC DYSFUNCTION OF PELVIC REGION: ICD-10-CM

## 2018-09-05 DIAGNOSIS — M54.41 RIGHT-SIDED LOW BACK PAIN WITH RIGHT-SIDED SCIATICA: Primary | ICD-10-CM

## 2018-09-05 LAB — FERRITIN SERPL-MCNC: 28 NG/ML (ref 8–252)

## 2018-09-05 PROCEDURE — G8981 BODY POS CURRENT STATUS: HCPCS | Mod: GP | Performed by: PHYSICAL THERAPIST

## 2018-09-05 PROCEDURE — 97110 THERAPEUTIC EXERCISES: CPT | Mod: GP | Performed by: PHYSICAL THERAPIST

## 2018-09-05 PROCEDURE — 97162 PT EVAL MOD COMPLEX 30 MIN: CPT | Mod: GP | Performed by: PHYSICAL THERAPIST

## 2018-09-05 PROCEDURE — 97140 MANUAL THERAPY 1/> REGIONS: CPT | Mod: GP | Performed by: PHYSICAL THERAPIST

## 2018-09-05 PROCEDURE — G8982 BODY POS GOAL STATUS: HCPCS | Mod: GP | Performed by: PHYSICAL THERAPIST

## 2018-09-05 NOTE — LETTER
DEPARTMENT OF HEALTH AND HUMAN SERVICES  CENTERS FOR MEDICARE & MEDICAID SERVICES    PLAN/UPDATED PLAN OF PROGRESS FOR OUTPATIENT REHABILITATION    PATIENTS NAME:  Ana Leyva   : 1945  PROVIDER NUMBER:    [id number]  HICN:  [id number]   PROVIDER NAME: [facility] [place] [facility]  MEDICAL RECORD NUMBER: [id number]   START OF CARE DATE:  SOC Date: 18   TYPE:  PT    PRIMARY/TREATMENT DIAGNOSIS: (Pertinent Medical Diagnosis)     Right-sided low back pain with right-sided sciatica  Segmental and somatic dysfunction of pelvic region    VISITS FROM START OF CARE:  Rxs Used: 1     [facility] Initial Evaluation  Subjective:Ana Leyva is a 72 year old female.    Patient s chief complaints:  bent over and fainted due to the pain and hit head. Had PT in  and was doing ok after that, and kept up with exercises for awhile and then exercises started bothering her so she stopped doing them.  Has been using support behind back for many years. Painful to lie on right side.Use to sleep 7 to 8 hours per night. Rode in car 4 hours week before pain started in back seat, and stayed over night and rode back the next day.   Condition occurred due to unknown reason.  Date of Onset: 2018  Location of symptoms is right low back and right leg to mid shin .  Symptoms other than pain include: cramps, aching   Quality of pain is aching and frequency is constant.    Pain dependence on time of day is: worse at night.   Pain rating is: 8-9/10.    Symptoms are exacerbated by: lying on right side, going down stairs.    Symptoms are relieved by:  Walking relieves back.    Progression of symptoms is that symptoms are:  Worsening over past week.  Imaging/Special tests include: nothing   Previous treatments include: PT for back.   Patient reports that general health is: good.   Pertinent medical history includes:  Tore quad tendon ,  thyroid problems, pain at night/rest, numbness/tingling, anemia.    Medical allergies includes: Penicillin.   Surgical history includes: torn quad tendon repair, rotator cuff repair left, hysterectomy.  Current medications include: thyroid, tylenol  Current occupation is: retired teacher  Work status is: retired  Primary job tasks include: lifting, carrying, repetitive tasks  Barriers include: difficulty sitting, lying down, sleeping  Red flags include: none  Patient's expectations for therapy include: Decrease pain, be able to sleep, be able to travel by Sept 15.  HPI  PATIENTS NAME:  Ana Leyva   : 1945                  Objective:  Lumbar/SI Evaluation  ROM:  Arom wnl lumbar: Excessive lumbar extension L3,4,5 in standing.  AROM Lumbar:   Flexion:          Fingertip to mid shin  Ext:                    10 degrees   Side Bend:        Left:  Fingertip to mid knee joint    Right:  Fingertip to mid knee joint  Rotation:           Left:     Right:   Side Glide:        Left:     Right:       AROM Thoracic:  Flex:              Ext:                 Rotation:        Left:  20 degrees    Right:  15 degrees   Lumbar Myotomes:    T12-L3 (Hip Flex):  Left: 4    Right: 4  L2-4 (Quads):  Left:  4    Right:  4  L4 (Ankle DF):  Left:  5    Right:  5  L5 (Great Toe Ext): Left: 5    Right: 5   S1 (Toe Raise):  Left: 5    Right: 5  Lumbar Palpation:  Palpation (lumbar): Extremely tight lateral hip rotators and hip flexors bilaterally, right  subscapularis and lat, right infraspinatus, right iliolumbar.  SI joint/Sacrum:      Sacral conclusion left:  Upslip, sacral torsion and posterior inominate  Sacral conclusion right:  Anterior inominate     General Evaluation:  Balance:  Balance wnl general: With SLS on either side arms out to side to assist with balance.  Single Leg Stance--Eyes Open:  Left: 5/30 sec    Right: 8/30 sec      Assessment/Plan:    Patient is a 73 year old female with lumbar, sacral and pelvic  complaints.    Patient has the following significant findings with corresponding treatment plan.                Diagnosis 1:  Low back pain  Pain -  hot/cold therapy, manual therapy, self management, education and home program  Decreased ROM/flexibility - manual therapy, therapeutic exercise, therapeutic activity and home program  Impaired posture - neuro re-education, therapeutic activities and home program  Diagnosis 2:  Segmental and somatic pelvic dysfunction   Pain -  hot/cold therapy, manual therapy, self management, education and home program  Decreased joint mobility - manual therapy, therapeutic exercise, therapeutic activity and home program  Impaired balance - neuro re-education, therapeutic activities and home program  Decreased proprioception - neuro re-education, therapeutic activities and home program  Decreased function - therapeutic activities and home program    Therapy Evaluation Codes:   1) History comprised of:   Personal factors that impact the plan of care:      Time since onset of symptoms.    Comorbidity factors that impact the plan of care are:    PATIENTS NAME:  Ana Leyva   : 1945      Numbness/tingling, Pain at night/rest and anemia and thyroid problems.     Medications impacting care: thyroid.  2) Examination of Body Systems comprised of:   Body structures and functions that impact the plan of care:      Lumbar spine, Pelvis, Sacral illiac joint and Shoulder.   Activity limitations that impact the plan of care are:      Bending, Driving, Jumping, Lifting, Reading/Computer work, Sitting, Squatting/kneeling, Stairs and Sleeping.  3) Clinical presentation characteristics are:   Stable/Uncomplicated.  4) Decision-Making    Moderate complexity using standardized patient assessment instrument and/or measureable assessment of functional outcome.  Cumulative Therapy Evaluation is: Moderate complexity.    Previous and current functional limitations:  (See Goal Flow Sheet for this  "information)    Short term and Long term goals: (See Goal Flow Sheet for this information)     Communication ability:  Patient appears to be able to clearly communicate and understand verbal and written communication and follow directions correctly.  Treatment Explanation - The following has been discussed with the patient:   RX ordered/plan of care  Anticipated outcomes  Possible risks and side effects  This patient would benefit from PT intervention to resume normal activities.   Rehab potential is good.    Frequency:  2 X week, once daily  Duration:  for 2 weeks tapering to 1 X a week over 8 weeks  Discharge Plan:  Achieve all LTG.  Independent in home treatment program.  Reach maximal therapeutic benefit.    Caregiver Signature/Credentials _____________________________ Date ________       Treating Provider: Lorna Wilcox PT, CSCS   I have reviewed and certified the need for these services and plan of treatment while under my care.        PHYSICIAN'S SIGNATURE:   _________________________________________  Date___________   VICENTE Branch CNP    Certification period:  Beginning of Cert date period: 09/05/18 to  End of Cert period date: 12/03/18     Functional Level Progress Report: Please see attached \"Goal Flow sheet for Functional level.\"    ____X____ Continue Services or       ________ DC Services              Service dates: From  SOC Date: 09/05/18 date to present                         "

## 2018-09-06 PROBLEM — M54.41 RIGHT-SIDED LOW BACK PAIN WITH RIGHT-SIDED SCIATICA: Status: ACTIVE | Noted: 2018-09-06

## 2018-09-06 PROBLEM — M99.05 SEGMENTAL AND SOMATIC DYSFUNCTION OF PELVIC REGION: Status: ACTIVE | Noted: 2018-09-06

## 2018-09-06 NOTE — PROGRESS NOTES
You don't appear to have anemia now Ana but your ferritin is low meaning you have low stored elements to make the heme molecules.  Please continue to take the iron supplement .

## 2018-09-07 ENCOUNTER — TELEPHONE (OUTPATIENT)
Dept: FAMILY MEDICINE | Facility: CLINIC | Age: 73
End: 2018-09-07

## 2018-09-07 ENCOUNTER — THERAPY VISIT (OUTPATIENT)
Dept: PHYSICAL THERAPY | Facility: CLINIC | Age: 73
End: 2018-09-07
Payer: COMMERCIAL

## 2018-09-07 DIAGNOSIS — M99.05 SEGMENTAL AND SOMATIC DYSFUNCTION OF PELVIC REGION: ICD-10-CM

## 2018-09-07 DIAGNOSIS — M54.41 RIGHT-SIDED LOW BACK PAIN WITH RIGHT-SIDED SCIATICA: ICD-10-CM

## 2018-09-07 PROCEDURE — 97110 THERAPEUTIC EXERCISES: CPT | Mod: GP | Performed by: PHYSICAL THERAPIST

## 2018-09-07 PROCEDURE — 97140 MANUAL THERAPY 1/> REGIONS: CPT | Mod: GP | Performed by: PHYSICAL THERAPIST

## 2018-09-07 PROCEDURE — 97112 NEUROMUSCULAR REEDUCATION: CPT | Mod: GP | Performed by: PHYSICAL THERAPIST

## 2018-09-07 NOTE — TELEPHONE ENCOUNTER
"Patient walked into clinic today     Redness about 1\" by 2\" round area of redness in area she received influenza vaccination (left arm) from 9/4/18.  Patient said area has been slightly warm to the touch and small amount of inflammation but denies other symptoms. States redness has decreased in the past couple of days.    Provided patient with a copy of the influenza VIS from the CDC     Discussed monitoring area, apply ice intermittently - call back or go to UC if no continued improvement or increased area of redness/swelling     Pt agreed    Roopa ALMANZA RN    "

## 2018-09-11 ENCOUNTER — THERAPY VISIT (OUTPATIENT)
Dept: PHYSICAL THERAPY | Facility: CLINIC | Age: 73
End: 2018-09-11
Payer: COMMERCIAL

## 2018-09-11 DIAGNOSIS — M54.41 RIGHT-SIDED LOW BACK PAIN WITH RIGHT-SIDED SCIATICA: ICD-10-CM

## 2018-09-11 DIAGNOSIS — M99.05 SEGMENTAL AND SOMATIC DYSFUNCTION OF PELVIC REGION: ICD-10-CM

## 2018-09-11 PROCEDURE — 97140 MANUAL THERAPY 1/> REGIONS: CPT | Mod: GP | Performed by: PHYSICAL THERAPIST

## 2018-09-11 PROCEDURE — 97112 NEUROMUSCULAR REEDUCATION: CPT | Mod: GP | Performed by: PHYSICAL THERAPIST

## 2018-09-11 PROCEDURE — 97110 THERAPEUTIC EXERCISES: CPT | Mod: GP | Performed by: PHYSICAL THERAPIST

## 2018-09-13 ENCOUNTER — THERAPY VISIT (OUTPATIENT)
Dept: PHYSICAL THERAPY | Facility: CLINIC | Age: 73
End: 2018-09-13
Payer: COMMERCIAL

## 2018-09-13 DIAGNOSIS — M99.05 SEGMENTAL AND SOMATIC DYSFUNCTION OF PELVIC REGION: ICD-10-CM

## 2018-09-13 DIAGNOSIS — M54.41 RIGHT-SIDED LOW BACK PAIN WITH RIGHT-SIDED SCIATICA: ICD-10-CM

## 2018-09-13 PROCEDURE — 97140 MANUAL THERAPY 1/> REGIONS: CPT | Mod: GP | Performed by: PHYSICAL THERAPIST

## 2018-09-13 PROCEDURE — 97112 NEUROMUSCULAR REEDUCATION: CPT | Mod: GP | Performed by: PHYSICAL THERAPIST

## 2018-09-13 PROCEDURE — 97110 THERAPEUTIC EXERCISES: CPT | Mod: GP | Performed by: PHYSICAL THERAPIST

## 2018-10-02 ENCOUNTER — THERAPY VISIT (OUTPATIENT)
Dept: PHYSICAL THERAPY | Facility: CLINIC | Age: 73
End: 2018-10-02
Payer: COMMERCIAL

## 2018-10-02 DIAGNOSIS — M54.41 RIGHT-SIDED LOW BACK PAIN WITH RIGHT-SIDED SCIATICA: ICD-10-CM

## 2018-10-02 DIAGNOSIS — M99.05 SEGMENTAL AND SOMATIC DYSFUNCTION OF PELVIC REGION: ICD-10-CM

## 2018-10-02 PROCEDURE — 97140 MANUAL THERAPY 1/> REGIONS: CPT | Mod: GP | Performed by: PHYSICAL THERAPIST

## 2018-10-02 PROCEDURE — 97110 THERAPEUTIC EXERCISES: CPT | Mod: GP | Performed by: PHYSICAL THERAPIST

## 2018-10-02 PROCEDURE — 97112 NEUROMUSCULAR REEDUCATION: CPT | Mod: GP | Performed by: PHYSICAL THERAPIST

## 2018-10-05 ENCOUNTER — THERAPY VISIT (OUTPATIENT)
Dept: PHYSICAL THERAPY | Facility: CLINIC | Age: 73
End: 2018-10-05
Payer: COMMERCIAL

## 2018-10-05 DIAGNOSIS — M54.41 RIGHT-SIDED LOW BACK PAIN WITH RIGHT-SIDED SCIATICA: ICD-10-CM

## 2018-10-05 DIAGNOSIS — M99.05 SEGMENTAL AND SOMATIC DYSFUNCTION OF PELVIC REGION: ICD-10-CM

## 2018-10-05 PROCEDURE — 97110 THERAPEUTIC EXERCISES: CPT | Mod: GP | Performed by: PHYSICAL THERAPIST

## 2018-10-05 PROCEDURE — 97112 NEUROMUSCULAR REEDUCATION: CPT | Mod: GP | Performed by: PHYSICAL THERAPIST

## 2018-10-05 PROCEDURE — 97140 MANUAL THERAPY 1/> REGIONS: CPT | Mod: GP | Performed by: PHYSICAL THERAPIST

## 2018-10-09 ENCOUNTER — THERAPY VISIT (OUTPATIENT)
Dept: PHYSICAL THERAPY | Facility: CLINIC | Age: 73
End: 2018-10-09
Payer: COMMERCIAL

## 2018-10-09 DIAGNOSIS — M54.41 RIGHT-SIDED LOW BACK PAIN WITH RIGHT-SIDED SCIATICA: ICD-10-CM

## 2018-10-09 DIAGNOSIS — M99.05 SEGMENTAL AND SOMATIC DYSFUNCTION OF PELVIC REGION: ICD-10-CM

## 2018-10-09 PROCEDURE — 97110 THERAPEUTIC EXERCISES: CPT | Mod: GP | Performed by: PHYSICAL THERAPIST

## 2018-10-09 PROCEDURE — 97112 NEUROMUSCULAR REEDUCATION: CPT | Mod: GP | Performed by: PHYSICAL THERAPIST

## 2018-10-18 ENCOUNTER — THERAPY VISIT (OUTPATIENT)
Dept: PHYSICAL THERAPY | Facility: CLINIC | Age: 73
End: 2018-10-18
Payer: COMMERCIAL

## 2018-10-18 DIAGNOSIS — M54.41 RIGHT-SIDED LOW BACK PAIN WITH RIGHT-SIDED SCIATICA: ICD-10-CM

## 2018-10-18 DIAGNOSIS — M99.05 SEGMENTAL AND SOMATIC DYSFUNCTION OF PELVIC REGION: ICD-10-CM

## 2018-10-18 PROCEDURE — G8981 BODY POS CURRENT STATUS: HCPCS | Mod: GP | Performed by: PHYSICAL THERAPIST

## 2018-10-18 PROCEDURE — 97110 THERAPEUTIC EXERCISES: CPT | Mod: GP | Performed by: PHYSICAL THERAPIST

## 2018-10-18 PROCEDURE — G8982 BODY POS GOAL STATUS: HCPCS | Mod: GP | Performed by: PHYSICAL THERAPIST

## 2018-10-18 PROCEDURE — G8983 BODY POS D/C STATUS: HCPCS | Mod: GP | Performed by: PHYSICAL THERAPIST

## 2018-10-18 PROCEDURE — 97112 NEUROMUSCULAR REEDUCATION: CPT | Mod: GP | Performed by: PHYSICAL THERAPIST

## 2018-10-19 PROBLEM — M54.41 RIGHT-SIDED LOW BACK PAIN WITH RIGHT-SIDED SCIATICA: Status: RESOLVED | Noted: 2018-09-06 | Resolved: 2018-10-18

## 2018-10-19 PROBLEM — M99.05 SEGMENTAL AND SOMATIC DYSFUNCTION OF PELVIC REGION: Status: RESOLVED | Noted: 2018-09-06 | Resolved: 2018-10-18

## 2018-10-19 NOTE — PROGRESS NOTES
Subjective:  HPI                    Objective:  System    Physical Exam    General     ROS    Assessment/Plan:    DISCHARGE REPORT    Progress reporting period is from 9/5/18 to 10/18/18.       SUBJECTIVE    Subjective: Patient reports she is able to climb stairs without using the railing with no leg, or back pain. She reports she is able to do all functional activities without pain. She is independent with her HEP    Current Pain level: 0/10.     } Initial Pain level: 9/10.   Changes in function: Patient reports she is able to perform all ADL's without c/o low back pain  Adverse reaction to treatment or activity: None    OBJECTIVE  Objective: Patient is able to SLS each leg and maintain level pelvis with pain for 30 seconds. She is able to ascend and desend stairs with level pelvis. She is able to demonstrate HEP correctly. No c/o cramping or aching in her legs.    ASSESSMENT/PLAN  Updated problem list and treatment plan: Diagnosis 1:  Low back pain    No problems at the present time.  STG/LTGs have been met or progress has been made towards goals:  Yes (See Goal flow sheet completed today.)  Assessment of Progress: The patient has met all of their long term goals.  Self Management Plans:  Patient has been instructed in a home treatment program.  Patient is independent in a home treatment program.  Patient  has been instructed in self management of symptoms.  Patient is independent in self management of symptoms.  I have re-evaluated this patient and find that the nature, scope, duration and intensity of the therapy is appropriate for the medical condition of the patient.  Ana continues to require the following intervention to meet STG and LTG's:  PT intervention is no longer required to meet STG/LTG.    Recommendations:  This patient is ready to be discharged from therapy and continue their home treatment program.    Please refer to the daily flowsheet for treatment today, total treatment time and time spent  performing 1:1 timed codes.

## 2018-11-07 ENCOUNTER — TELEPHONE (OUTPATIENT)
Dept: FAMILY MEDICINE | Facility: CLINIC | Age: 73
End: 2018-11-07

## 2018-11-07 NOTE — TELEPHONE ENCOUNTER
Returned pt's call: Spoke with , who states pt just left to  grandchildren. Left message to please have pt call back to Clinic.       Deepika DAS RN

## 2018-11-07 NOTE — TELEPHONE ENCOUNTER
Reason for call:  Patient reporting a symptom    Symptom or request:Pain for last two days in her right side.  Not  constant but when she stands up.     Duration (how long have symptoms been present): two days    Have you been treated for this before? No    Additional comments: Set up appt on Friday with STEFANIA Trevino to be seen but feels she should speak to a nurse today  Phone Number patient can be reached at:  Home number on file [phone] (home)    Best Time:  Anytime today     Can we leave a detailed message on this number:  NO    Call taken on 11/7/2018 at 2:53 PM by Odalys Sykes

## 2018-11-09 ENCOUNTER — OFFICE VISIT (OUTPATIENT)
Dept: FAMILY MEDICINE | Facility: CLINIC | Age: 73
End: 2018-11-09
Payer: COMMERCIAL

## 2018-11-09 DIAGNOSIS — R25.2 BILATERAL LEG CRAMPS: Primary | ICD-10-CM

## 2018-11-09 DIAGNOSIS — R10.32 GROIN PAIN, LEFT: ICD-10-CM

## 2018-11-09 PROCEDURE — 99213 OFFICE O/P EST LOW 20 MIN: CPT | Performed by: NURSE PRACTITIONER

## 2018-11-09 NOTE — PROGRESS NOTES
HPI      SUBJECTIVE:   Ana Leyva is a 73 year old female who presents to clinic today for the following health issues:      Chief Complaint   Patient presents with     Legs cramping     at night, off and on for 2 months.       Main concerns are the bilateral calf cramping she is experiencing at night while she sleeps  Bilateral calf cramping has been going on for 2 or more months now  States it wakes her and she has to get out of bed to stretch  Very painful  Reports no changes in diet  On calcium and magnesium supplements  No recent illness    Reports that the left groin pain is resolving and she feels much better  She drove 6 hours total when it started [place] evening)  States she works towards 10,000 steps/day and states the pain worsens after walking which is what she did after the 3 hour car ride.  Today she doesn't really have the pain at all   No swelling, enlarged lymph nodes, or painful areas to touch        Past Medical History:   Diagnosis Date     Acquired hypothyroidism      Allergic rhinitis, cause unspecified      Osteopenia      Shoulder impingement     Left     Thyrotoxicosis without mention of goiter or other cause, without mention of thyrotoxic crisis or storm 1991    RX EPPS     Family History   Problem Relation Age of Onset     Hypertension Father      C.A.D. Mother      MI early 60's     Diabetes Mother      Obesity Mother      Pancreatic Cancer Sister      HEART DISEASE Sister      MI @ 56; another sister with MI @ 62     Circulatory Brother      blood clots     HEART DISEASE Paternal Grandmother      Diabetes Maternal Grandfather      Alzheimer Disease Paternal Aunt      Alzheimer Disease Paternal Uncle      Anesthesia Reaction No family hx of      Past Surgical History:   Procedure Laterality Date     C DEXA, BONE DENSITY, AXIAL SKEL  11-28-02    normal     C VAGINAL HYSTERECTOMY  1980    ovaries intact, fibroids     COLONOSCOPY N/A 11/27/2017     Procedure: COLONOSCOPY;  colonoscopy;  Surgeon: Donell Nelson MD;  Location:  GI     HYSTERECTOMY, PAP NO LONGER INDICATED  1980    see above     LIGATN/STRIP LONG OR SHORT SAPHEN  2002     ROTATOR CUFF REPAIR RT/LT  4-13    RT     SURGICAL HISTORY OF -   7-31-08    Quadriceps tendon repair R     Social History   Substance Use Topics     Smoking status: Never Smoker     Smokeless tobacco: Never Used      Comment: tried in college     Alcohol use 0.0 oz/week     0 Standard drinks or equivalent per week      Comment: occas wine - very rare     Current Outpatient Prescriptions   Medication Sig Dispense Refill     Calcium 600-400 MG-UNIT CHEW Take 1 chew tab by mouth daily       cholecalciferol (VITAMIN D) 1000 UNIT tablet Take 1 tablet by mouth daily. 100 tablet 3     Flaxseed, Linseed, (FLAXSEED OIL) 1200 MG CAPS Take 1 capsule by mouth daily       GLUCOSAMINE-CHONDROIT-MSM-C-MN PO 1500 - 800 - 750mg       IRON PO Take 1 tablet by mouth daily       levothyroxine (SYNTHROID/LEVOTHROID) 88 MCG tablet Take 1 tablet (88 mcg) by mouth daily 90 tablet 3     Magnesium 500 MG CAPS Take 1 capsule by mouth daily 30 capsule      Multiple Vitamin (DAILY MULTIVITAMIN PO) Take  by mouth. Mature 50+       Omega-3 Fatty Acids (OMEGA-3 FISH OIL PO) Take 2 g by mouth daily       Allergies   Allergen Reactions     Amoxicillin Shortness Of Breath and Rash     Pt reported sx on phone from Amoxicillin RX given - sx showed up on day 8       Reviewed and updated as needed this visit by clinical staff and provider     ROS  Detailed as above        Physical Exam   Constitutional: She is oriented to person, place, and time and well-developed, well-nourished, and in no distress.   HENT:   Head: Normocephalic.   Neck: Normal range of motion.   Musculoskeletal: Normal range of motion.   Neurological: She is alert and oriented to person, place, and time. Gait normal.   Skin: Skin is warm and dry.   Psychiatric: Mood and affect normal.        Assessment and Plan:       ICD-10-CM    1. Bilateral leg cramps R25.2    2. Groin pain, left R10.32        Bilateral calf charley horse for many months. Iron studies recently completed and on iron supplementation. No concerns for DVT. Continue magnesium supplement, push fluids, and encouraged stretching at night before bed. Scheduled annual exam for next week with Dr. Saldana.    Left groin pain which is resolving suspected muscular from sitting a long time.  Call is symptoms worsen or with questions      VICENTE Mackay, CNP  [facility]

## 2018-11-09 NOTE — MR AVS SNAPSHOT
After Visit Summary   11/9/2018    Ana Leyva    MRN: [id number]           Patient Information     Date Of Birth          1945        Visit Information        Provider Department      11/9/2018 9:30 AM Felisha Trevino APRN CNP [facility]        Today's Diagnoses     Bilateral leg cramps    -  1    Groin pain, left           Follow-ups after your visit        Your next 10 appointments already scheduled     Nov 19, 2018 10:30 AM CST   PHYSICAL with Marlen Hua,    [facility]    [address] [facility] [address] [phone]              Who to contact     If you have questions or need follow up information about today's clinic visit or your schedule please contact [facility] directly at [phone].  Normal or non-critical lab and imaging results will be communicated to you by MyChart, letter or phone within 4 business days after the clinic has received the results. If you do not hear from us within 7 days, please contact the clinic through [employer] or phone. If you have a critical or abnormal lab result, we will notify you by phone as soon as possible.  Submit refill requests through [employer] or call your pharmacy and they will forward the refill request to us. Please allow 3 business days for your refill to be completed.          Additional Information About Your Visit        MyChart Information     [employer] gives you secure access to your electronic health record. If you see a primary care provider, you can also send messages to your care team and make appointments. If you have questions, please call your primary care clinic.  If you do not have a primary care provider, please call [phone] and they will assist you.        Care EveryWhere ID     This is your Care EveryWhere ID. This could be used by other organizations to access your [place] medical records  [id number]         Blood Pressure from  Last 3 Encounters:   09/04/18 128/68   11/27/17 102/60   11/17/17 130/71    Weight from Last 3 Encounters:   09/04/18 162 lb 9.6 oz (73.8 kg)   11/17/17 158 lb (71.7 kg)   09/22/17 163 lb (73.9 kg)              Today, you had the following     No orders found for display       Primary Care Provider Office Phone # Fax #    Marlen Hua,  [phone] [address]  [facility] [address]        Equal Access to Services     [facility]: Hadii aad ku hadasho Soomaali, waaxda luqadaha, qaybta kaalmada adeegyada, juan manuel ospina . So [facility] [phone].    ATENCIÓN: Si habla español, tiene a garsia disposición servicios gratuitos de asistencia lingüística. Llame al [phone].    We comply with applicable federal civil rights laws and [place] laws. We do not discriminate on the basis of race, color, national origin, age, disability, sex, sexual orientation, or gender identity.            Thank you!     Thank you for choosing [facility]  for your care. Our goal is always to provide you with excellent care. Hearing back from our patients is one way we can continue to improve our services. Please take a few minutes to complete the written survey that you may receive in the mail after your visit with us. Thank you!             Your Updated Medication List - Protect others around you: Learn how to safely use, store and throw away your medicines at www.disposemymeds.org.          This list is accurate as of 11/9/18 11:59 PM.  Always use your most recent med list.                   Brand Name Dispense Instructions for use Diagnosis    Calcium 600-400 MG-UNIT Chew      Take 1 chew tab by mouth daily        cholecalciferol 1000 UNIT tablet    vitamin D3    100 tablet    Take 1 tablet by mouth daily.    Routine general medical examination at a health care facility       DAILY MULTIVITAMIN PO      Take  by mouth. Mature 50+        Flaxseed Oil 1200 MG Caps      Take  1 capsule by mouth daily        GLUCOSAMINE-CHONDROIT-MSM-C-MN PO      1500 - 800 - 750mg        IRON PO      Take 1 tablet by mouth daily        levothyroxine 88 MCG tablet    SYNTHROID/LEVOTHROID    90 tablet    Take 1 tablet (88 mcg) by mouth daily    Acquired hypothyroidism       Magnesium 500 MG Caps     30 capsule    Take 1 capsule by mouth daily        OMEGA-3 FISH OIL PO      Take 2 g by mouth daily

## 2018-11-19 ENCOUNTER — OFFICE VISIT (OUTPATIENT)
Dept: FAMILY MEDICINE | Facility: CLINIC | Age: 73
End: 2018-11-19
Payer: COMMERCIAL

## 2018-11-19 VITALS
HEIGHT: 64 IN | SYSTOLIC BLOOD PRESSURE: 111 MMHG | WEIGHT: 158 LBS | DIASTOLIC BLOOD PRESSURE: 65 MMHG | OXYGEN SATURATION: 100 % | BODY MASS INDEX: 26.98 KG/M2 | HEART RATE: 59 BPM | TEMPERATURE: 97.1 F

## 2018-11-19 DIAGNOSIS — M85.80 OSTEOPENIA, UNSPECIFIED LOCATION: Chronic | ICD-10-CM

## 2018-11-19 DIAGNOSIS — E03.9 ACQUIRED HYPOTHYROIDISM: Chronic | ICD-10-CM

## 2018-11-19 DIAGNOSIS — Z00.00 MEDICARE ANNUAL WELLNESS VISIT, SUBSEQUENT: Primary | ICD-10-CM

## 2018-11-19 DIAGNOSIS — Z71.89 ADVANCED DIRECTIVES, COUNSELING/DISCUSSION: ICD-10-CM

## 2018-11-19 DIAGNOSIS — Z52.008 BLOOD DONOR: ICD-10-CM

## 2018-11-19 DIAGNOSIS — R25.2 LEG CRAMPS: ICD-10-CM

## 2018-11-19 LAB
ERYTHROCYTE [DISTWIDTH] IN BLOOD BY AUTOMATED COUNT: 15.2 % (ref 10–15)
HCT VFR BLD AUTO: 39.4 % (ref 35–47)
HGB BLD-MCNC: 12.2 G/DL (ref 11.7–15.7)
MCH RBC QN AUTO: 26.6 PG (ref 26.5–33)
MCHC RBC AUTO-ENTMCNC: 31 G/DL (ref 31.5–36.5)
MCV RBC AUTO: 86 FL (ref 78–100)
PLATELET # BLD AUTO: 328 10E9/L (ref 150–450)
RBC # BLD AUTO: 4.58 10E12/L (ref 3.8–5.2)
WBC # BLD AUTO: 5.3 10E9/L (ref 4–11)

## 2018-11-19 PROCEDURE — 85027 COMPLETE CBC AUTOMATED: CPT | Performed by: INTERNAL MEDICINE

## 2018-11-19 PROCEDURE — 36415 COLL VENOUS BLD VENIPUNCTURE: CPT | Performed by: INTERNAL MEDICINE

## 2018-11-19 PROCEDURE — 99397 PER PM REEVAL EST PAT 65+ YR: CPT | Performed by: INTERNAL MEDICINE

## 2018-11-19 PROCEDURE — 80053 COMPREHEN METABOLIC PANEL: CPT | Performed by: INTERNAL MEDICINE

## 2018-11-19 PROCEDURE — 83735 ASSAY OF MAGNESIUM: CPT | Performed by: INTERNAL MEDICINE

## 2018-11-19 PROCEDURE — 84443 ASSAY THYROID STIM HORMONE: CPT | Performed by: INTERNAL MEDICINE

## 2018-11-19 PROCEDURE — 80061 LIPID PANEL: CPT | Performed by: INTERNAL MEDICINE

## 2018-11-19 RX ORDER — LEVOTHYROXINE SODIUM 88 UG/1
88 TABLET ORAL DAILY
Qty: 90 TABLET | Refills: 3 | Status: SHIPPED | OUTPATIENT
Start: 2018-11-19 | End: 2019-01-23

## 2018-11-19 NOTE — PATIENT INSTRUCTIONS
Labs today     Try to stretch your legs before bedtime and tonic water too may help with cramps    Shingrix  is:  1) Recommended for healthy adults aged 50 years and older to help prevent shingles and its related complications such as pain related to the shingles virus  2) Recommended for adults who previously received the previous shingles vaccine (Zostavax )  3) The preferred vaccine for helping to prevent shingles and its related complications  4) It is a series of TWO vaccines with the second dose administered between 2-6 months after the first dose in this series  5) PLEASE CHECK COST WITH YOUR INSURANCE COMPANY OR YOUR LOCAL PHARMACY AS EACH DOSE MAY BE AS MUCH AS APPROXIMATELY $300 IF NOT COVERED BY INSURANCE    Follow up annually or as needed    Preventive Health Recommendations  See your health care provider every year to    Review health changes.     Discuss preventive care.      Review your medicines if your doctor has prescribed any.      You no longer need a yearly Pap test unless you've had an abnormal Pap test in the past 10 years. If you have vaginal symptoms, such as bleeding or discharge, be sure to talk with your provider about a Pap test.      Every 1 to 2 years, have a mammogram.  If you are over 69, talk with your health care provider about whether or not you want to continue having screening mammograms.      Every 10 years, have a colonoscopy. Or, have a yearly FIT test (stool test). These exams will check for colon cancer.       Have a cholesterol test every 5 years, or more often if your doctor advises it.       Have a diabetes test (fasting glucose) every three years. If you are at risk for diabetes, you should have this test more often.       At age 65, have a bone density scan (DEXA) to check for osteoporosis (brittle bone disease).    Shots:    Get a flu shot each year.    Get a tetanus shot every 10 years.    Talk to your doctor about your pneumonia vaccines. There are now two you  should receive - Pneumovax (PPSV 23) and Prevnar (PCV 13).    Talk to your pharmacist about the shingles vaccine.    Talk to your doctor about the hepatitis B vaccine.    Nutrition:     Eat at least 5 servings of fruits and vegetables each day.      Eat whole-grain bread, whole-wheat pasta and brown rice instead of white grains and rice.      Get adequate Calcium and Vitamin D.     Lifestyle    Exercise at least 150 minutes a week (30 minutes a day, 5 days a week). This will help you control your weight and prevent disease.      Limit alcohol to one drink per day.      No smoking.       Wear sunscreen to prevent skin cancer.       See your dentist twice a year for an exam and cleaning.      See your eye doctor every 1 to 2 years to screen for conditions such as glaucoma, macular degeneration and cataracts.    Personalized Prevention Plan  You are due for the preventive services outlined below.  Your care team is available to assist you in scheduling these services.  If you have already completed any of these items, please share that information with your care team to update in your medical record.  Health Maintenance Due   Topic Date Due     Thyroid Function Lab (TSH) - yearly  11/17/2018

## 2018-11-19 NOTE — MR AVS SNAPSHOT
After Visit Summary   11/19/2018    Ana Leyva    MRN: [id number]           Patient Information     Date Of Birth          1945        Visit Information        Provider Department      11/19/2018 10:30 AM Marlen Hua DO [facility]        Today's Diagnoses     Medicare annual wellness visit, subsequent    -  1    Osteopenia, unspecified location        Acquired hypothyroidism        Blood donor        Leg cramps          Care Instructions    Labs today     Try to stretch your legs before bedtime and tonic water too may help with cramps    Shingrix  is:  1) Recommended for healthy adults aged 50 years and older to help prevent shingles and its related complications such as pain related to the shingles virus  2) Recommended for adults who previously received the previous shingles vaccine (Zostavax )  3) The preferred vaccine for helping to prevent shingles and its related complications  4) It is a series of TWO vaccines with the second dose administered between 2-6 months after the first dose in this series  5) PLEASE CHECK COST WITH YOUR INSURANCE COMPANY OR YOUR LOCAL PHARMACY AS EACH DOSE MAY BE AS MUCH AS APPROXIMATELY $300 IF NOT COVERED BY INSURANCE    Follow up annually or as needed    Preventive Health Recommendations  See your health care provider every year to    Review health changes.     Discuss preventive care.      Review your medicines if your doctor has prescribed any.      You no longer need a yearly Pap test unless you've had an abnormal Pap test in the past 10 years. If you have vaginal symptoms, such as bleeding or discharge, be sure to talk with your provider about a Pap test.      Every 1 to 2 years, have a mammogram.  If you are over 69, talk with your health care provider about whether or not you want to continue having screening mammograms.      Every 10 years, have a colonoscopy. Or, have a yearly FIT test (stool test). These exams will check for  colon cancer.       Have a cholesterol test every 5 years, or more often if your doctor advises it.       Have a diabetes test (fasting glucose) every three years. If you are at risk for diabetes, you should have this test more often.       At age 65, have a bone density scan (DEXA) to check for osteoporosis (brittle bone disease).    Shots:    Get a flu shot each year.    Get a tetanus shot every 10 years.    Talk to your doctor about your pneumonia vaccines. There are now two you should receive - Pneumovax (PPSV 23) and Prevnar (PCV 13).    Talk to your pharmacist about the shingles vaccine.    Talk to your doctor about the hepatitis B vaccine.    Nutrition:     Eat at least 5 servings of fruits and vegetables each day.      Eat whole-grain bread, whole-wheat pasta and brown rice instead of white grains and rice.      Get adequate Calcium and Vitamin D.     Lifestyle    Exercise at least 150 minutes a week (30 minutes a day, 5 days a week). This will help you control your weight and prevent disease.      Limit alcohol to one drink per day.      No smoking.       Wear sunscreen to prevent skin cancer.       See your dentist twice a year for an exam and cleaning.      See your eye doctor every 1 to 2 years to screen for conditions such as glaucoma, macular degeneration and cataracts.    Personalized Prevention Plan  You are due for the preventive services outlined below.  Your care team is available to assist you in scheduling these services.  If you have already completed any of these items, please share that information with your care team to update in your medical record.  Health Maintenance Due   Topic Date Due     Thyroid Function Lab (TSH) - yearly  11/17/2018             Follow-ups after your visit        Who to contact     If you have questions or need follow up information about today's clinic visit or your schedule please contact [facility] directly at [phone].  Normal or non-critical lab  "and imaging results will be communicated to you by [employer], letter or phone within 4 business days after the clinic has received the results. If you do not hear from us within 7 days, please contact the clinic through [employer] or phone. If you have a critical or abnormal lab result, we will notify you by phone as soon as possible.  Submit refill requests through [employer] or call your pharmacy and they will forward the refill request to us. Please allow 3 business days for your refill to be completed.          Additional Information About Your Visit        [email or web address] Information     [employer] gives you secure access to your electronic health record. If you see a primary care provider, you can also send messages to your care team and make appointments. If you have questions, please call your primary care clinic.  If you do not have a primary care provider, please call [phone] and they will assist you.        Care EveryWhere ID     This is your Care EveryWhere ID. This could be used by other organizations to access your [place] medical records  [id number]        Your Vitals Were     Pulse Temperature Height Pulse Oximetry BMI (Body Mass Index)       59 97.1  F (36.2  C) (Oral) 5' 3.5\" (1.613 m) 100% 27.55 kg/m2        Blood Pressure from Last 3 Encounters:   11/19/18 111/65   09/04/18 128/68   11/27/17 102/60    Weight from Last 3 Encounters:   11/19/18 158 lb (71.7 kg)   09/04/18 162 lb 9.6 oz (73.8 kg)   11/17/17 158 lb (71.7 kg)              We Performed the Following     CBC with platelets     Comprehensive metabolic panel     Lipid panel reflex to direct LDL Fasting     Magnesium     TSH WITH FREE T4 REFLEX          Today's Medication Changes          These changes are accurate as of 11/19/18 11:37 AM.  If you have any questions, ask your nurse or doctor.               Stop taking these medicines if you haven't already. Please contact your care team if you have questions.     Calcium 600-400 MG-UNIT Chew "   Stopped by:  Marlen Hua, DO           DAILY MULTIVITAMIN PO   Stopped by:  Marlen Hua, DO           OMEGA-3 FISH OIL PO   Stopped by:  Marlen Hua, DO                Where to get your medicines      Some of these will need a paper prescription and others can be bought over the counter.  Ask your nurse if you have questions.     Bring a paper prescription for each of these medications     levothyroxine 88 MCG tablet                Primary Care Provider Office Phone # Fax #    Marlen Hua [phone] [address]  [facility] [address]        Equal Access to Services     [facility]: Hadii aad ku hadasho Soomaali, waaxda luqadaha, qaybta kaalmada adeegyada, juan manuel ospina . So [facility] [phone].    ATENCIÓN: Si habla español, tiene a garsia disposición servicios gratuitos de asistencia lingüística. Llame al [phone].    We comply with applicable federal civil rights laws and [place] laws. We do not discriminate on the basis of race, color, national origin, age, disability, sex, sexual orientation, or gender identity.            Thank you!     Thank you for choosing [facility]  for your care. Our goal is always to provide you with excellent care. Hearing back from our patients is one way we can continue to improve our services. Please take a few minutes to complete the written survey that you may receive in the mail after your visit with us. Thank you!             Your Updated Medication List - Protect others around you: Learn how to safely use, store and throw away your medicines at www.disposemymeds.org.          This list is accurate as of 11/19/18 11:37 AM.  Always use your most recent med list.                   Brand Name Dispense Instructions for use Diagnosis    CALCIUM CARBONATE PO      Take by mouth daily        cholecalciferol 1000 UNIT tablet    vitamin D3    100 tablet    Take 1 tablet by mouth daily.     Routine general medical examination at a health care facility       Flaxseed Oil 1200 MG Caps      Take 1 capsule by mouth daily        GLUCOSAMINE-CHONDROIT-MSM-C-MN PO      1500 - 800 - 750mg        IRON PO      Take 1 tablet by mouth three times a week        levothyroxine 88 MCG tablet    SYNTHROID/LEVOTHROID    90 tablet    Take 1 tablet (88 mcg) by mouth daily    Acquired hypothyroidism       Magnesium 500 MG Caps     30 capsule    Take 1 capsule by mouth daily

## 2018-11-19 NOTE — PROGRESS NOTES
"  SUBJECTIVE:   Ana Leyva is a 73 year old female who presents for Preventive Visit.  Are you in the first 12 months of your Medicare Part B coverage?  No    Physical Health:    In general, how would you rate your overall physical health? good    Outside of work, how many days during the week do you exercise? 6-7 days/week -10,000 steps per day!    Outside of work, approximately how many minutes a day do you exercise?45-60 minutes    If you drink alcohol do you typically have >3 drinks per day or >7 drinks per week? No    Do you usually eat at least 4 servings of fruit and vegetables a day, include whole grains & fiber and avoid regularly eating high fat or \"junk\" foods? Yes    Do you have any problems taking medications regularly?  No    Do you have any side effects from medications? none    Needs assistance for the following daily activities: no assistance needed    Which of the following safety concerns are present in your home?:  none identified     Hearing impairment: No    In the past 6 months, have you been bothered by leaking of urine? no    Mental Health:    In general, how would you rate your overall mental or emotional health? excellent  PHQ-2 Score:     PHQ-2 ( 1999 [employer]) 11/19/2018 11/9/2018   Q1: Little interest or pleasure in doing things 0 0   Q2: Feeling down, depressed or hopeless 0 0   PHQ-2 Score 0 0         Additional concerns to address? Reviewed vitamins. Does eat healthy with a big salad daily and is going to [employer] with great success. Spends winter in [place] with . Looking to future transition to senior housing. Still donating blood and recent iron stores stable. Some leg cramps only at night. Does stay hydrated. Feels very good overall.    Fall risk:      Fallen 2 or more times in the past year?: No  Any fall with injury in the past year?: No    COGNITIVE SCREEN  1) Repeat 3 items (Leader, Season, Table)    2) Clock draw: NORMAL  3) 3 item recall: Recalls 3 " "objects  Results: 3 items recalled: COGNITIVE IMPAIRMENT LESS LIKELY    Mini-CogTM Copyright S Virgil. Licensed by the author for use in [facility]; reprinted with permission (camacho@.[facility]). All rights reserved.      Wt Readings from Last 4 Encounters:   11/19/18 158 lb (71.7 kg)   09/04/18 162 lb 9.6 oz (73.8 kg)   11/17/17 158 lb (71.7 kg)   09/22/17 163 lb (73.9 kg)     Meds        Social History   Substance Use Topics     Smoking status: Never Smoker     Smokeless tobacco: Never Used      Comment: tried in college     Alcohol use 0.0 oz/week     0 Standard drinks or equivalent per week      Comment: occas wine - 1 per month                         Do you feel safe in your environment - Yes    Do you have a Health Care Directive?: Yes: Patient states has Advance Directive and will bring in a copy to clinic. (Brought in today)    Current providers sharing in care for this patient include:   Patient Care Team:  Marlen Hua DO as PCP - General (Internal Medicine)    The following health maintenance items are reviewed in Epic and correct as of today:  Health Maintenance   Topic Date Due     TSH W/ FREE T4 REFLEX Q1 YEAR  11/17/2018     FALL RISK ASSESSMENT  11/09/2019     PHQ-2 Q1 YR  11/09/2019     MAMMO SCREEN Q2 YR (SYSTEM ASSIGNED)  12/04/2019     DEXA Q3 YR  12/04/2020     ADVANCE DIRECTIVE PLANNING Q5 YRS  11/12/2021     LIPID MONITORING Q5 YEARS  11/17/2022     TETANUS Q10 YR  09/07/2026     COLONOSCOPY Q10 YR  11/27/2027     PNEUMOCOCCAL  Completed     INFLUENZA VACCINE  Completed     HEPATITIS C SCREENING  Completed       ROS:  Comprehensive ROS negative unless as stated above in HPI.     OBJECTIVE:   /65 (BP Location: Right arm, Cuff Size: Adult Large)  Pulse 59  Temp 97.1  F (36.2  C) (Oral)  Ht 5' 3.5\" (1.613 m)  Wt 158 lb (71.7 kg)  SpO2 100%  BMI 27.55 kg/m2 Estimated body mass index is 27.55 kg/(m^2) as calculated from the following:    Height as of this encounter: 5' " "3.5\" (1.613 m).    Weight as of this encounter: 158 lb (71.7 kg).  EXAM:   GENERAL APPEARANCE: healthy, alert and no distress, robust  EYES: Eyes grossly normal to inspection, PERRL and conjunctivae and sclerae normal  HENT: ear canals and TM's normal (after removal of mild ear wax bilaterally by me with wax curette), nose and mouth without ulcers or lesions, oropharynx clear and oral mucous membranes moist  NECK: no adenopathy, no asymmetry, masses, or scars and thyroid normal to palpation  RESP: lungs clear to auscultation - no rales, rhonchi or wheezes  BREAST: normal without masses, tenderness or nipple discharge and no palpable axillary masses or adenopathy  CV: regular rate and rhythm, normal S1 S2, no S3 or S4, no murmur, click or rub, no peripheral edema and non-thrombosed bilateral lower extremity superficial varicosities   ABDOMEN: soft, nontender, no hepatosplenomegaly, no masses and bowel sounds normal  MS: no musculoskeletal defects are noted and gait is age appropriate without ataxia  SKIN: no suspicious lesions or rashes  NEURO: Normal strength and tone, mentation intact and speech normal  PSYCH: mentation appears normal and affect normal/bright    ASSESSMENT / PLAN:   1. Medicare annual wellness visit, subsequent  Up to date on preventative health care and is doing a great job of taking care of herself  Goes to dermatologist and plans future mammogram  - Lipid panel reflex to direct LDL Fasting    2. Osteopenia, unspecified location  Stable DEXA last year; great weight bearing exercise and takes Ca/D  - Comprehensive metabolic panel    3. Acquired hypothyroidism  S/p EPPS  - TSH WITH FREE T4 REFLEX  - levothyroxine (SYNTHROID/LEVOTHROID) 88 MCG tablet; Take 1 tablet (88 mcg) by mouth daily  Dispense: 90 tablet; Refill: 3    4. Blood donor  Takes low dose iron  - CBC with platelets    5. Leg cramps  Try stretching before bedtime and tonic water  - Magnesium    6. Advanced directives, " "counseling/discussion  Brought in copy of advanced directive today        End of Life Planning:  Patient currently has an advanced directive: Brought in a copy of updated HCD today    COUNSELING:  Reviewed preventive health counseling, as reflected in patient instructions       Regular exercise       Healthy diet/nutrition    BP Readings from Last 1 Encounters:   11/19/18 111/65     Estimated body mass index is 27.55 kg/(m^2) as calculated from the following:    Height as of this encounter: 5' 3.5\" (1.613 m).    Weight as of this encounter: 158 lb (71.7 kg).   reports that she has never smoked. She has never used smokeless tobacco.      Appropriate preventive services were discussed with this patient, including applicable screening as appropriate for cardiovascular disease, diabetes, osteopenia/osteoporosis, and glaucoma.  As appropriate for age/gender, discussed screening for colorectal cancer, prostate cancer, breast cancer, and cervical cancer. Checklist reviewing preventive services available has been given to the patient.    Reviewed patients plan of care and provided an AVS. The Intermediate Care Plan ( asthma action plan, low back pain action plan, and migraine action plan) for Ana meets the Care Plan requirement. This Care Plan has been established and reviewed with the Patient.    Patient Instructions     Labs today     Try to stretch your legs before bedtime and tonic water too may help with cramps    Shingrix  is:  1) Recommended for healthy adults aged 50 years and older to help prevent shingles and its related complications such as pain related to the shingles virus  2) Recommended for adults who previously received the previous shingles vaccine (Zostavax )  3) The preferred vaccine for helping to prevent shingles and its related complications  4) It is a series of TWO vaccines with the second dose administered between 2-6 months after the first dose in this series  5) PLEASE CHECK COST WITH YOUR " INSURANCE COMPANY OR YOUR LOCAL PHARMACY AS EACH DOSE MAY BE AS MUCH AS APPROXIMATELY $300 IF NOT COVERED BY INSURANCE    Follow up annually or as needed      Marlen Hua,   [facility]

## 2018-11-20 LAB
ALBUMIN SERPL-MCNC: 4 G/DL (ref 3.4–5)
ALP SERPL-CCNC: 71 U/L (ref 40–150)
ALT SERPL W P-5'-P-CCNC: 18 U/L (ref 0–50)
ANION GAP SERPL CALCULATED.3IONS-SCNC: 7 MMOL/L (ref 3–14)
AST SERPL W P-5'-P-CCNC: 25 U/L (ref 0–45)
BILIRUB SERPL-MCNC: 0.7 MG/DL (ref 0.2–1.3)
BUN SERPL-MCNC: 11 MG/DL (ref 7–30)
CALCIUM SERPL-MCNC: 9.4 MG/DL (ref 8.5–10.1)
CHLORIDE SERPL-SCNC: 103 MMOL/L (ref 94–109)
CHOLEST SERPL-MCNC: 140 MG/DL
CO2 SERPL-SCNC: 26 MMOL/L (ref 20–32)
CREAT SERPL-MCNC: 0.85 MG/DL (ref 0.52–1.04)
GFR SERPL CREATININE-BSD FRML MDRD: 66 ML/MIN/1.7M2
GLUCOSE SERPL-MCNC: 93 MG/DL (ref 70–99)
HDLC SERPL-MCNC: 75 MG/DL
LDLC SERPL CALC-MCNC: 51 MG/DL
MAGNESIUM SERPL-MCNC: 2.3 MG/DL (ref 1.6–2.3)
NONHDLC SERPL-MCNC: 65 MG/DL
POTASSIUM SERPL-SCNC: 4.8 MMOL/L (ref 3.4–5.3)
PROT SERPL-MCNC: 7.6 G/DL (ref 6.8–8.8)
SODIUM SERPL-SCNC: 136 MMOL/L (ref 133–144)
TRIGL SERPL-MCNC: 71 MG/DL
TSH SERPL DL<=0.005 MIU/L-ACNC: 1.03 MU/L (ref 0.4–4)

## 2018-11-26 ENCOUNTER — HOSPITAL ENCOUNTER (OUTPATIENT)
Dept: MAMMOGRAPHY | Facility: CLINIC | Age: 73
Discharge: HOME | End: 2018-11-26
Attending: INTERNAL MEDICINE | Admitting: INTERNAL MEDICINE
Payer: MEDICARE

## 2018-11-26 DIAGNOSIS — Z12.31 VISIT FOR SCREENING MAMMOGRAM: ICD-10-CM

## 2018-11-26 PROCEDURE — 77063 BREAST TOMOSYNTHESIS BI: CPT

## 2018-12-10 ENCOUNTER — TELEPHONE (OUTPATIENT)
Dept: FAMILY MEDICINE | Facility: CLINIC | Age: 73
End: 2018-12-10

## 2018-12-10 NOTE — TELEPHONE ENCOUNTER
Reason for call:  Patient reporting a symptom    Symptom or request: bump on top of left foot    Duration (how long have symptoms been present): one week    Have you been treated for this before? No    Additional comments: patient wondering if she needs to be seen for this?    Could it be arthritis?  Or does she need to see a podiatrist?    please call to advise.    Phone Number patient can be reached at:  Cell number on file:    Telephone Information:   Mobile [phone]       Best Time:  anytime    Can we leave a detailed message on this number:  YES    Call taken on 12/10/2018 at 4:30 PM by Colleen Cadet.

## 2018-12-11 NOTE — TELEPHONE ENCOUNTER
Returned patient call.  Patient has had a bump on top of left foot above instep for past week.  No known trauma.  Patient thinks it is similar to arthritis in hand.  Per patient lump does not move when it is palpated and feels hard around the edges.  Red on right side of bump, no discharge, swelling.  Patient placed ice pack on site on yesterday.    Patient does walk daily and had to loosen tennis shoes.  Patient is a snow bird and is wondering if patient should be seen by a provider where she is currently residing in [place] and is wondering if she should be seen by a podiatrist?  Patient is checking insurance to see if a referral is necessary.    Routing to provider for review and advise as appropriate.    Patient can be reached at: [phone], ok to leave detailed message.

## 2018-12-11 NOTE — TELEPHONE ENCOUNTER
Hard to know for sure without seeing it; definitely could be arthritis or a cyst. Likely isn't serious though so she could try rest and ice to see if improves in time.    If not improving, offer podiatry or even could start with a general practioner in [place] if that is better covered by her insurance plan.

## 2018-12-12 NOTE — TELEPHONE ENCOUNTER
Detailed message left for patient with below message from PCP and asking her to call back if any questions    Roopa ALMANZA RN

## 2019-01-23 DIAGNOSIS — E03.9 ACQUIRED HYPOTHYROIDISM: Chronic | ICD-10-CM

## 2019-01-23 NOTE — TELEPHONE ENCOUNTER
"levothyroxine (SYNTHROID/LEVOTHROID)    Last Written Prescription Date:  11/19/2018  Last Fill Quantity: 90,  # refills: 3   Last office visit: 11/19/2018 with prescribing provider: Javid  Future Office Visit:  11/19/2019    Requested Prescriptions   Pending Prescriptions Disp Refills     levothyroxine (SYNTHROID/LEVOTHROID) 88 MCG tablet [Pharmacy Med Name: LEVOTHYROXINE 88 MCG TABLET] 90 tablet 2     Sig: TAKE 1 TABLET BY MOUTH DAILY    Thyroid Protocol Passed - 1/23/2019  4:40 AM       Passed - Patient is 12 years or older       Passed - Recent (12 mo) or future (30 days) visit within the authorizing provider's specialty    Patient had office visit in the last 12 months or has a visit in the next 30 days with authorizing provider or within the authorizing provider's specialty.  See \"Patient Info\" tab in inbasket, or \"Choose Columns\" in Meds & Orders section of the refill encounter.             Passed - Medication is active on med list       Passed - Normal TSH on file in past 12 months    Recent Labs   Lab Test 11/19/18  1145   TSH 1.03             Passed - No active pregnancy on record    If patient is pregnant or has had a positive pregnancy test, please check TSH.         Passed - No positive pregnancy test in past 12 months    If patient is pregnant or has had a positive pregnancy test, please check TSH.            "

## 2019-01-24 RX ORDER — LEVOTHYROXINE SODIUM 88 UG/1
TABLET ORAL
Qty: 90 TABLET | Refills: 2 | Status: SHIPPED | OUTPATIENT
Start: 2019-01-24

## 2019-11-27 ENCOUNTER — HOSPITAL ENCOUNTER (OUTPATIENT)
Dept: MAMMOGRAPHY | Facility: CLINIC | Age: 74
Discharge: HOME | End: 2019-11-27
Attending: INTERNAL MEDICINE | Admitting: INTERNAL MEDICINE
Payer: COMMERCIAL

## 2019-11-27 DIAGNOSIS — Z12.31 VISIT FOR SCREENING MAMMOGRAM: ICD-10-CM

## 2019-11-27 PROCEDURE — 77063 BREAST TOMOSYNTHESIS BI: CPT

## 2019-12-09 ENCOUNTER — HEALTH MAINTENANCE LETTER (OUTPATIENT)
Age: 74
End: 2019-12-09

## 2020-03-15 ENCOUNTER — HEALTH MAINTENANCE LETTER (OUTPATIENT)
Age: 75
End: 2020-03-15

## 2020-04-09 DIAGNOSIS — E03.9 ACQUIRED HYPOTHYROIDISM: Chronic | ICD-10-CM

## 2020-04-09 RX ORDER — LEVOTHYROXINE SODIUM 88 UG/1
TABLET ORAL
Qty: 90 TABLET | Refills: 1 | OUTPATIENT
Start: 2020-04-09

## 2020-04-09 NOTE — TELEPHONE ENCOUNTER
Spoke with patient who states she has a new PCP and will be getting refills from them.  Patient will call pharmacy and notify of the change and refill request will be sent to new provider.    Jacquie Nguyen MA

## 2021-01-15 ENCOUNTER — HEALTH MAINTENANCE LETTER (OUTPATIENT)
Age: 76
End: 2021-01-15

## 2021-05-09 ENCOUNTER — HEALTH MAINTENANCE LETTER (OUTPATIENT)
Age: 76
End: 2021-05-09

## 2021-10-24 ENCOUNTER — HEALTH MAINTENANCE LETTER (OUTPATIENT)
Age: 76
End: 2021-10-24

## 2022-02-13 ENCOUNTER — HEALTH MAINTENANCE LETTER (OUTPATIENT)
Age: 77
End: 2022-02-13

## 2022-06-05 ENCOUNTER — HEALTH MAINTENANCE LETTER (OUTPATIENT)
Age: 77
End: 2022-06-05

## 2022-10-15 ENCOUNTER — HEALTH MAINTENANCE LETTER (OUTPATIENT)
Age: 77
End: 2022-10-15

## 2023-06-11 ENCOUNTER — HEALTH MAINTENANCE LETTER (OUTPATIENT)
Age: 78
End: 2023-06-11
